# Patient Record
Sex: FEMALE | Race: WHITE | NOT HISPANIC OR LATINO | Employment: OTHER | URBAN - METROPOLITAN AREA
[De-identification: names, ages, dates, MRNs, and addresses within clinical notes are randomized per-mention and may not be internally consistent; named-entity substitution may affect disease eponyms.]

---

## 2018-03-02 ENCOUNTER — AMBULATORY CONVERSION ENCOUNTER (OUTPATIENT)
Dept: FAMILY MEDICINE | Facility: CLINIC | Age: 83
End: 2018-03-02

## 2018-04-03 RX ORDER — PREDNISONE 10 MG/1
TABLET ORAL
Qty: 30 TABLET | Refills: 0 | Status: SHIPPED | OUTPATIENT
Start: 2018-04-03 | End: 2018-04-05 | Stop reason: SDUPTHER

## 2018-04-03 RX ORDER — PROCHLORPERAZINE MALEATE 5 MG
TABLET ORAL
Qty: 30 TABLET | Refills: 1 | Status: SHIPPED | OUTPATIENT
Start: 2018-04-03 | End: 2018-05-25

## 2018-04-05 ENCOUNTER — OFFICE VISIT (OUTPATIENT)
Dept: FAMILY MEDICINE | Facility: CLINIC | Age: 83
End: 2018-04-05
Payer: MEDICARE

## 2018-04-05 VITALS
HEART RATE: 60 BPM | HEIGHT: 60 IN | SYSTOLIC BLOOD PRESSURE: 122 MMHG | DIASTOLIC BLOOD PRESSURE: 70 MMHG | TEMPERATURE: 98.2 F | WEIGHT: 95 LBS | BODY MASS INDEX: 18.65 KG/M2 | RESPIRATION RATE: 12 BRPM

## 2018-04-05 DIAGNOSIS — F32.A ANXIETY AND DEPRESSION: ICD-10-CM

## 2018-04-05 DIAGNOSIS — J01.00 ACUTE NON-RECURRENT MAXILLARY SINUSITIS: Primary | ICD-10-CM

## 2018-04-05 DIAGNOSIS — M15.9 OSTEOARTHRITIS INVOLVING MULTIPLE JOINTS ON BOTH SIDES OF BODY: ICD-10-CM

## 2018-04-05 DIAGNOSIS — F41.9 ANXIETY AND DEPRESSION: ICD-10-CM

## 2018-04-05 PROBLEM — M51.369 DEGENERATIVE DISC DISEASE, LUMBAR: Status: ACTIVE | Noted: 2017-01-09

## 2018-04-05 PROBLEM — K21.9 GERD WITHOUT ESOPHAGITIS: Status: ACTIVE | Noted: 2018-04-05

## 2018-04-05 PROBLEM — N18.9 CHRONIC KIDNEY DISEASE: Status: ACTIVE | Noted: 2017-01-09

## 2018-04-05 PROBLEM — M10.9 GOUT: Status: ACTIVE | Noted: 2017-01-09

## 2018-04-05 PROBLEM — E03.9 HYPOTHYROIDISM: Status: ACTIVE | Noted: 2017-01-09

## 2018-04-05 PROBLEM — J45.20 MILD INTERMITTENT ASTHMA WITHOUT COMPLICATION: Status: ACTIVE | Noted: 2018-04-05

## 2018-04-05 PROCEDURE — 99214 OFFICE O/P EST MOD 30 MIN: CPT | Performed by: FAMILY MEDICINE

## 2018-04-05 RX ORDER — LEVOTHYROXINE SODIUM 100 UG/1
TABLET ORAL
Refills: 0 | COMMUNITY
Start: 2018-04-03 | End: 2018-06-13

## 2018-04-05 RX ORDER — PROCHLORPERAZINE MALEATE 5 MG
5 TABLET ORAL
COMMUNITY
Start: 2017-11-28 | End: 2018-08-09

## 2018-04-05 RX ORDER — MUPIROCIN 20 MG/G
2 OINTMENT TOPICAL
COMMUNITY
Start: 2017-07-07 | End: 2020-12-30

## 2018-04-05 RX ORDER — ALLOPURINOL 300 MG/1
300 TABLET ORAL DAILY
COMMUNITY
Start: 2017-11-28 | End: 2018-06-01 | Stop reason: SDUPTHER

## 2018-04-05 RX ORDER — BUPROPION HYDROCHLORIDE 100 MG/1
100 TABLET, EXTENDED RELEASE ORAL DAILY
COMMUNITY
Start: 2017-11-28 | End: 2018-06-04 | Stop reason: SDUPTHER

## 2018-04-05 RX ORDER — NITROFURANTOIN 25; 75 MG/1; MG/1
CAPSULE ORAL
Refills: 0 | COMMUNITY
Start: 2018-02-02 | End: 2018-06-13

## 2018-04-05 RX ORDER — PREDNISONE 2.5 MG/1
2.5 TABLET ORAL 3 TIMES DAILY
COMMUNITY
Start: 2018-02-02 | End: 2018-04-05 | Stop reason: SDUPTHER

## 2018-04-05 RX ORDER — CITALOPRAM 20 MG/1
20 TABLET, FILM COATED ORAL DAILY
COMMUNITY
Start: 2017-07-03 | End: 2018-04-05 | Stop reason: SDUPTHER

## 2018-04-05 RX ORDER — LEVOTHYROXINE SODIUM 88 UG/1
88 TABLET ORAL DAILY
COMMUNITY
Start: 2018-01-15 | End: 2019-01-16

## 2018-04-05 RX ORDER — PREDNISONE 1 MG/1
TABLET ORAL
Qty: 120 TABLET | Refills: 1 | Status: SHIPPED | OUTPATIENT
Start: 2018-04-05 | End: 2018-05-25

## 2018-04-05 RX ORDER — ESTRADIOL 0.1 MG/G
CREAM VAGINAL
COMMUNITY
Start: 2018-02-02 | End: 2020-12-30

## 2018-04-05 RX ORDER — CEFDINIR 300 MG/1
300 CAPSULE ORAL 2 TIMES DAILY
Qty: 14 CAPSULE | Refills: 0 | Status: SHIPPED | OUTPATIENT
Start: 2018-04-05 | End: 2019-02-15

## 2018-04-05 RX ORDER — CITALOPRAM 20 MG/1
20 TABLET, FILM COATED ORAL DAILY
Qty: 90 TABLET | Refills: 1 | Status: SHIPPED | OUTPATIENT
Start: 2018-04-05 | End: 2018-09-30 | Stop reason: SDUPTHER

## 2018-04-05 RX ORDER — OMEPRAZOLE 40 MG/1
40 CAPSULE, DELAYED RELEASE ORAL DAILY
Refills: 0 | COMMUNITY
Start: 2018-02-28 | End: 2019-07-05

## 2018-04-05 RX ORDER — CALCIUM CARBONATE/VITAMIN D3 500-10/5ML
400 LIQUID (ML) ORAL
COMMUNITY
Start: 2017-01-09

## 2018-04-05 ASSESSMENT — ENCOUNTER SYMPTOMS
RHINORRHEA: 1
SORE THROAT: 1
COUGH: 1
SINUS PAIN: 1

## 2018-04-05 NOTE — PROGRESS NOTES
Patient ID: Lida Foley is a 82 y.o. female.    Subjective     Arthritis-- wants to stop prednisone. Makes her feel bloated.    Ran out of celexa and feelign depresses/crying.      URI    This is a new problem. The current episode started 1 to 4 weeks ago. There has been no fever. Associated symptoms include coughing, rhinorrhea, sinus pain and a sore throat.       The following have been reviewed and updated as appropriate in this visit:       Review of Systems   HENT: Positive for rhinorrhea, sinus pain and sore throat.    Respiratory: Positive for cough.          Objective     Physical Exam   Constitutional: She appears well-developed.   HENT:   Head: Normocephalic. Head is with right periorbital erythema and with left periorbital erythema.   Nose: Sinus tenderness (bilateral frontal and maxillary) present.   Mouth/Throat: Oropharynx is clear and moist.   Eyes: Pupils are equal, round, and reactive to light.   Neck: Normal range of motion.   Cardiovascular: Normal rate, regular rhythm and normal heart sounds.    Pulmonary/Chest: Effort normal and breath sounds normal.   Lymphadenopathy:     She has no cervical adenopathy.   Skin: Skin is warm and dry.   Nursing note and vitals reviewed.      Assessment/Plan       Problem List Items Addressed This Visit     Anxiety and depression    Relevant Medications    citalopram (CELEXA) 20 mg tablet    Osteoarthritis involving multiple joints on both sides of body    Acute non-recurrent maxillary sinusitis - Primary

## 2018-05-25 ENCOUNTER — HOSPITAL ENCOUNTER (OUTPATIENT)
Dept: RADIOLOGY | Facility: CLINIC | Age: 83
Discharge: HOME | End: 2018-05-25
Attending: FAMILY MEDICINE
Payer: MEDICARE

## 2018-05-25 ENCOUNTER — OFFICE VISIT (OUTPATIENT)
Dept: FAMILY MEDICINE | Facility: CLINIC | Age: 83
End: 2018-05-25
Payer: MEDICARE

## 2018-05-25 VITALS
RESPIRATION RATE: 20 BRPM | TEMPERATURE: 98.1 F | BODY MASS INDEX: 19.24 KG/M2 | DIASTOLIC BLOOD PRESSURE: 62 MMHG | HEIGHT: 60 IN | OXYGEN SATURATION: 96 % | WEIGHT: 98 LBS | HEART RATE: 80 BPM | SYSTOLIC BLOOD PRESSURE: 98 MMHG

## 2018-05-25 DIAGNOSIS — J01.00 ACUTE NON-RECURRENT MAXILLARY SINUSITIS: Primary | ICD-10-CM

## 2018-05-25 DIAGNOSIS — R05.9 COUGH: ICD-10-CM

## 2018-05-25 PROCEDURE — 99214 OFFICE O/P EST MOD 30 MIN: CPT | Performed by: FAMILY MEDICINE

## 2018-05-25 PROCEDURE — 71046 X-RAY EXAM CHEST 2 VIEWS: CPT

## 2018-05-25 RX ORDER — CEFDINIR 300 MG/1
300 CAPSULE ORAL 2 TIMES DAILY
Qty: 14 CAPSULE | Refills: 0 | Status: SHIPPED | OUTPATIENT
Start: 2018-05-25 | End: 2019-02-15

## 2018-05-25 RX ORDER — ZOLEDRONIC ACID 5 MG/100ML
5 INJECTION, SOLUTION INTRAVENOUS ONCE
COMMUNITY
End: 2021-08-31

## 2018-05-25 ASSESSMENT — ENCOUNTER SYMPTOMS
SHORTNESS OF BREATH: 0
COUGH: 1
WHEEZING: 0
SINUS PAIN: 1
SORE THROAT: 0

## 2018-05-25 NOTE — PROGRESS NOTES
Patient ID: Lida Foley is a 83 y.o. female.    Subjective     Having maxillary sinua pain.       URI    Episode onset: 2 weeks ago. There has been no fever. Associated symptoms include coughing and sinus pain. Pertinent negatives include no chest pain, sore throat or wheezing.       The following have been reviewed and updated as appropriate in this visit:  Tobacco  Allergies  Meds  Problems       Review of Systems   Constitutional: Positive for fatigue. Negative for fever.   HENT: Positive for sinus pain. Negative for sore throat.    Respiratory: Positive for cough. Negative for shortness of breath and wheezing.    Cardiovascular: Negative for chest pain.         Objective     Physical Exam   Constitutional: She appears well-developed.   HENT:   Head: Normocephalic.   Mouth/Throat: Oropharynx is clear and moist.   Eyes: Pupils are equal, round, and reactive to light.   Neck: Normal range of motion. No thyromegaly present.   Cardiovascular: Normal rate, regular rhythm and normal heart sounds.    No murmur heard.  Pulmonary/Chest: Effort normal and breath sounds normal.   Diminished breath sounds left base.   Abdominal: Soft. There is no tenderness.   Musculoskeletal: She exhibits no edema.   Lymphadenopathy:     She has no cervical adenopathy.   Skin: Skin is warm and dry.   Nursing note and vitals reviewed.      Assessment/Plan       Problem List Items Addressed This Visit     Acute non-recurrent maxillary sinusitis - Primary     Cefdinir. Supportive care. Saline.          Cough     Obtain chest xray.          Relevant Orders    X-RAY CHEST 2 VIEWS (Completed)

## 2018-05-29 ENCOUNTER — TELEPHONE (OUTPATIENT)
Dept: FAMILY MEDICINE | Facility: CLINIC | Age: 83
End: 2018-05-29

## 2018-05-30 ASSESSMENT — ENCOUNTER SYMPTOMS
FATIGUE: 1
FEVER: 0

## 2018-06-01 RX ORDER — ALLOPURINOL 300 MG/1
TABLET ORAL
Qty: 90 TABLET | Refills: 1 | Status: SHIPPED | OUTPATIENT
Start: 2018-06-01 | End: 2018-06-04 | Stop reason: SDUPTHER

## 2018-06-04 RX ORDER — ALLOPURINOL 300 MG/1
300 TABLET ORAL
Qty: 90 TABLET | Refills: 1 | Status: SHIPPED | OUTPATIENT
Start: 2018-06-04 | End: 2019-12-12 | Stop reason: SDUPTHER

## 2018-06-04 RX ORDER — BUPROPION HYDROCHLORIDE 100 MG/1
TABLET, EXTENDED RELEASE ORAL
Qty: 90 TABLET | Refills: 1 | Status: SHIPPED | OUTPATIENT
Start: 2018-06-04 | End: 2019-04-18

## 2018-06-04 RX ORDER — PROCHLORPERAZINE MALEATE 5 MG
TABLET ORAL
Qty: 30 TABLET | Refills: 1 | Status: SHIPPED | OUTPATIENT
Start: 2018-06-04 | End: 2018-06-13

## 2018-06-13 ENCOUNTER — OFFICE VISIT (OUTPATIENT)
Dept: FAMILY MEDICINE | Facility: CLINIC | Age: 83
End: 2018-06-13
Payer: MEDICARE

## 2018-06-13 VITALS
HEIGHT: 60 IN | WEIGHT: 99 LBS | HEART RATE: 60 BPM | RESPIRATION RATE: 20 BRPM | BODY MASS INDEX: 19.44 KG/M2 | SYSTOLIC BLOOD PRESSURE: 102 MMHG | DIASTOLIC BLOOD PRESSURE: 60 MMHG | TEMPERATURE: 98.3 F

## 2018-06-13 DIAGNOSIS — J32.8 OTHER CHRONIC SINUSITIS: Primary | ICD-10-CM

## 2018-06-13 PROBLEM — I82.409 DVT (DEEP VENOUS THROMBOSIS) (CMS/HCC): Status: ACTIVE | Noted: 2018-06-13

## 2018-06-13 PROBLEM — E78.5 HYPERLIPIDEMIA: Status: ACTIVE | Noted: 2018-06-13

## 2018-06-13 PROBLEM — K31.84 GASTROPARESIS: Status: ACTIVE | Noted: 2018-06-13

## 2018-06-13 PROBLEM — A49.02 MRSA (METHICILLIN RESISTANT STAPHYLOCOCCUS AUREUS): Status: ACTIVE | Noted: 2018-06-13

## 2018-06-13 PROBLEM — J01.00 ACUTE NON-RECURRENT MAXILLARY SINUSITIS: Status: RESOLVED | Noted: 2018-04-05 | Resolved: 2018-06-13

## 2018-06-13 PROCEDURE — 99213 OFFICE O/P EST LOW 20 MIN: CPT | Performed by: FAMILY MEDICINE

## 2018-06-13 RX ORDER — MONTELUKAST SODIUM 10 MG/1
10 TABLET ORAL NIGHTLY
Qty: 90 TABLET | Refills: 1 | Status: SHIPPED | OUTPATIENT
Start: 2018-06-13 | End: 2018-09-21 | Stop reason: SDUPTHER

## 2018-06-13 ASSESSMENT — ENCOUNTER SYMPTOMS: SINUS PAIN: 1

## 2018-06-13 NOTE — ASSESSMENT & PLAN NOTE
Did not respond to recent abx. Suspect more inflammatory than infectious. Asked her to f/u with ENT. Trial of singulair. Continue nasal steroid spray.

## 2018-06-13 NOTE — PATIENT INSTRUCTIONS
Lida was seen today for uri.    Diagnoses and all orders for this visit:    Other chronic sinusitis    Other orders  -     montelukast (SINGULAIR) 10 mg tablet; Take 1 tablet (10 mg total) by mouth nightly.    Continue using Rhinocort nasal spray daily  Sched appt with ENT

## 2018-06-14 ENCOUNTER — TRANSCRIBE ORDERS (OUTPATIENT)
Dept: SCHEDULING | Age: 83
End: 2018-06-14

## 2018-06-14 DIAGNOSIS — N18.2 CHRONIC KIDNEY DISEASE, STAGE II (MILD): Primary | ICD-10-CM

## 2018-06-26 ENCOUNTER — HOSPITAL ENCOUNTER (OUTPATIENT)
Dept: RADIOLOGY | Age: 83
Discharge: HOME | End: 2018-06-26
Attending: INTERNAL MEDICINE
Payer: MEDICARE

## 2018-06-26 DIAGNOSIS — N18.2 CHRONIC KIDNEY DISEASE, STAGE II (MILD): ICD-10-CM

## 2018-06-26 PROCEDURE — 76770 US EXAM ABDO BACK WALL COMP: CPT

## 2018-07-20 ENCOUNTER — OFFICE VISIT (OUTPATIENT)
Dept: FAMILY MEDICINE | Facility: CLINIC | Age: 83
End: 2018-07-20
Payer: MEDICARE

## 2018-07-20 VITALS
DIASTOLIC BLOOD PRESSURE: 68 MMHG | TEMPERATURE: 97.8 F | WEIGHT: 95.6 LBS | BODY MASS INDEX: 18.77 KG/M2 | HEIGHT: 60 IN | SYSTOLIC BLOOD PRESSURE: 100 MMHG | HEART RATE: 80 BPM

## 2018-07-20 DIAGNOSIS — K31.84 GASTROPARESIS: Primary | ICD-10-CM

## 2018-07-20 DIAGNOSIS — M81.0 AGE-RELATED OSTEOPOROSIS WITHOUT CURRENT PATHOLOGICAL FRACTURE: ICD-10-CM

## 2018-07-20 DIAGNOSIS — R63.4 WEIGHT LOSS, UNINTENTIONAL: ICD-10-CM

## 2018-07-20 DIAGNOSIS — F32.A ANXIETY AND DEPRESSION: ICD-10-CM

## 2018-07-20 DIAGNOSIS — F41.9 ANXIETY AND DEPRESSION: ICD-10-CM

## 2018-07-20 DIAGNOSIS — E06.3 HYPOTHYROIDISM DUE TO HASHIMOTO'S THYROIDITIS: ICD-10-CM

## 2018-07-20 DIAGNOSIS — J45.20 MILD INTERMITTENT ASTHMA WITHOUT COMPLICATION: ICD-10-CM

## 2018-07-20 DIAGNOSIS — N18.2 STAGE 2 CHRONIC KIDNEY DISEASE: ICD-10-CM

## 2018-07-20 DIAGNOSIS — K21.9 GERD WITHOUT ESOPHAGITIS: ICD-10-CM

## 2018-07-20 PROBLEM — R05.9 COUGH: Status: RESOLVED | Noted: 2018-05-25 | Resolved: 2018-07-20

## 2018-07-20 PROCEDURE — 99214 OFFICE O/P EST MOD 30 MIN: CPT | Performed by: FAMILY MEDICINE

## 2018-07-20 ASSESSMENT — ENCOUNTER SYMPTOMS
CHEST TIGHTNESS: 0
ABDOMINAL PAIN: 0
SHORTNESS OF BREATH: 0
BLOOD IN STOOL: 0
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
## 2018-08-08 NOTE — TELEPHONE ENCOUNTER
Pt has been out of medication for several days.    Last Office Visit  0720/2018        prochlorperazine maleate 5 mg take 1 tablet by mouth every 8 hours if needed      Medication List     Pharmacy confirmed.

## 2018-08-09 RX ORDER — PROCHLORPERAZINE MALEATE 5 MG
TABLET ORAL
Qty: 30 TABLET | Refills: 1 | Status: SHIPPED | OUTPATIENT
Start: 2018-08-09 | End: 2018-10-01 | Stop reason: SDUPTHER

## 2018-09-21 ENCOUNTER — OFFICE VISIT (OUTPATIENT)
Dept: FAMILY MEDICINE | Facility: CLINIC | Age: 83
End: 2018-09-21
Payer: MEDICARE

## 2018-09-21 VITALS
BODY MASS INDEX: 19.16 KG/M2 | HEIGHT: 60 IN | TEMPERATURE: 97.5 F | HEART RATE: 72 BPM | SYSTOLIC BLOOD PRESSURE: 120 MMHG | WEIGHT: 97.6 LBS | DIASTOLIC BLOOD PRESSURE: 60 MMHG

## 2018-09-21 DIAGNOSIS — Z23 NEED FOR IMMUNIZATION AGAINST INFLUENZA: Primary | ICD-10-CM

## 2018-09-21 DIAGNOSIS — M15.9 OSTEOARTHRITIS INVOLVING MULTIPLE JOINTS ON BOTH SIDES OF BODY: ICD-10-CM

## 2018-09-21 DIAGNOSIS — J30.0 CHRONIC VASOMOTOR RHINITIS: ICD-10-CM

## 2018-09-21 DIAGNOSIS — F43.10 STRESS DISORDER, POST TRAUMATIC: ICD-10-CM

## 2018-09-21 DIAGNOSIS — K21.9 GERD WITHOUT ESOPHAGITIS: ICD-10-CM

## 2018-09-21 DIAGNOSIS — N18.2 STAGE 2 CHRONIC KIDNEY DISEASE: ICD-10-CM

## 2018-09-21 DIAGNOSIS — J45.20 MILD INTERMITTENT ASTHMA WITHOUT COMPLICATION: ICD-10-CM

## 2018-09-21 DIAGNOSIS — L29.9 PRURITUS: ICD-10-CM

## 2018-09-21 DIAGNOSIS — E06.3 HYPOTHYROIDISM DUE TO HASHIMOTO'S THYROIDITIS: ICD-10-CM

## 2018-09-21 DIAGNOSIS — M51.369 DEGENERATIVE DISC DISEASE, LUMBAR: ICD-10-CM

## 2018-09-21 PROCEDURE — G0008 ADMIN INFLUENZA VIRUS VAC: HCPCS | Performed by: FAMILY MEDICINE

## 2018-09-21 PROCEDURE — 99214 OFFICE O/P EST MOD 30 MIN: CPT | Mod: 25 | Performed by: FAMILY MEDICINE

## 2018-09-21 PROCEDURE — 90653 IIV ADJUVANT VACCINE IM: CPT | Performed by: FAMILY MEDICINE

## 2018-09-21 RX ORDER — IPRATROPIUM BROMIDE 42 UG/1
2 SPRAY, METERED NASAL 3 TIMES DAILY
Qty: 15 ML | Refills: 12 | Status: SHIPPED | OUTPATIENT
Start: 2018-09-21 | End: 2019-09-21

## 2018-09-21 RX ORDER — MINERAL OIL
180 ENEMA (ML) RECTAL DAILY
COMMUNITY

## 2018-09-21 RX ORDER — MONTELUKAST SODIUM 10 MG/1
10 TABLET ORAL NIGHTLY
Qty: 90 TABLET | Refills: 1 | Status: SHIPPED | OUTPATIENT
Start: 2018-09-21 | End: 2019-04-18

## 2018-09-21 NOTE — PATIENT INSTRUCTIONS
Switch skin care products to Vanicream soap and cream (available at [facility])
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
## 2018-09-26 PROBLEM — F43.10 STRESS DISORDER, POST TRAUMATIC: Status: ACTIVE | Noted: 2018-09-26

## 2018-09-26 ASSESSMENT — ENCOUNTER SYMPTOMS
COUGH: 1
RHINORRHEA: 1
ABDOMINAL PAIN: 0
DYSURIA: 0
FEVER: 0

## 2018-10-01 NOTE — TELEPHONE ENCOUNTER
Pt son dropped in and she needs a refill on prochlorperazine 5 mg  One tablet  q 8 hrs   And citalopram 20mg  Which is already sent to dr López

## 2018-10-02 RX ORDER — CITALOPRAM 20 MG/1
TABLET, FILM COATED ORAL
Qty: 90 TABLET | Refills: 1 | Status: SHIPPED | OUTPATIENT
Start: 2018-10-02 | End: 2019-04-03 | Stop reason: SDUPTHER

## 2018-10-02 RX ORDER — PROCHLORPERAZINE MALEATE 5 MG
5 TABLET ORAL EVERY 8 HOURS PRN
Qty: 30 TABLET | Refills: 1 | Status: SHIPPED | OUTPATIENT
Start: 2018-10-02 | End: 2018-10-19

## 2018-10-19 ENCOUNTER — OFFICE VISIT (OUTPATIENT)
Dept: FAMILY MEDICINE | Facility: CLINIC | Age: 83
End: 2018-10-19
Payer: MEDICARE

## 2018-10-19 VITALS
OXYGEN SATURATION: 97 % | BODY MASS INDEX: 19.75 KG/M2 | DIASTOLIC BLOOD PRESSURE: 58 MMHG | TEMPERATURE: 97.7 F | HEART RATE: 72 BPM | RESPIRATION RATE: 20 BRPM | HEIGHT: 60 IN | WEIGHT: 100.6 LBS | SYSTOLIC BLOOD PRESSURE: 104 MMHG

## 2018-10-19 DIAGNOSIS — J01.80 ACUTE NON-RECURRENT SINUSITIS OF OTHER SINUS: Primary | ICD-10-CM

## 2018-10-19 DIAGNOSIS — R11.0 NAUSEA: ICD-10-CM

## 2018-10-19 PROCEDURE — 99213 OFFICE O/P EST LOW 20 MIN: CPT | Performed by: FAMILY MEDICINE

## 2018-10-19 RX ORDER — ONDANSETRON 4 MG/1
4 TABLET, FILM COATED ORAL EVERY 8 HOURS PRN
Qty: 30 TABLET | Refills: 2 | Status: SHIPPED | OUTPATIENT
Start: 2018-10-19 | End: 2019-07-05

## 2018-10-19 RX ORDER — CEFDINIR 300 MG/1
300 CAPSULE ORAL 2 TIMES DAILY
Qty: 14 CAPSULE | Refills: 0 | Status: SHIPPED | OUTPATIENT
Start: 2018-10-19 | End: 2019-01-10 | Stop reason: SDUPTHER

## 2018-10-19 ASSESSMENT — ENCOUNTER SYMPTOMS
COUGH: 1
HEADACHES: 1
RHINORRHEA: 1
SINUS PAIN: 1
NAUSEA: 1

## 2018-10-19 NOTE — PROGRESS NOTES
Patient ID: Lida Foley is a 83 y.o. female.        Subjective     URI    This is a new problem. The current episode started 1 to 4 weeks ago. There has been no fever. Associated symptoms include coughing, headaches, nausea, rhinorrhea and sinus pain.         The following have been reviewed and updated as appropriate in this visit:  Allergies  Meds  Problems       Patient Active Problem List   Diagnosis   • Anxiety and depression   • Stage 2 chronic kidney disease   • Degenerative disc disease, lumbar   • Gout   • Hypothyroidism due to Hashimoto's thyroiditis   • Mild intermittent asthma without complication   • GERD without esophagitis   • Osteoarthritis involving multiple joints on both sides of body   • Other chronic sinusitis   • MRSA (methicillin resistant Staphylococcus aureus)   • PAC (premature atrial contraction)   • Hyperlipidemia   • Gastroparesis   • DVT (deep venous thrombosis) (CMS/HCC) (HCC)   • Age-related osteoporosis without current pathological fracture   • Weight loss, unintentional   • Pruritus   • Chronic vasomotor rhinitis   • Stress disorder, post traumatic   • Nausea       Current Outpatient Prescriptions:   •  allopurinol (ZYLOPRIM) 300 mg tablet, Take 1 tablet (300 mg total) by mouth once daily., Disp: 90 tablet, Rfl: 1  •  buPROPion SR (WELLBUTRIN SR) 100 mg 12 hr tablet, take 1 tablet by mouth once daily, Disp: 90 tablet, Rfl: 1  •  citalopram (CELEXA) 20 mg tablet, take 1 tablet by mouth once daily, Disp: 90 tablet, Rfl: 1  •  conjugated estrogens (PREMARIN) 0.625 mg/gram vaginal cream, apply small pea sized amount to external vaginal/urethral area daily for 2 weeks then 2x/week, Disp: , Rfl:   •  estradiol (ESTRACE) 0.01 % (0.1 mg/gram) vaginal cream, insert (1G)  by vaginal route 2x/week, Disp: , Rfl:   •  fexofenadine (ALLEGRA) 180 mg tablet, Take 180 mg by mouth daily., Disp: , Rfl:   •  ipratropium (ATROVENT) 42 mcg (0.06 %) nasal spray, Administer 2 sprays into each  nostril 3 (three) times a day., Disp: 15 mL, Rfl: 12  •  levothyroxine (SYNTHROID) 88 mcg tablet, Take 88 mcg by mouth daily., Disp: , Rfl:   •  magnesium oxide 400 mg capsule, 400 mg., Disp: , Rfl:   •  montelukast (SINGULAIR) 10 mg tablet, Take 1 tablet (10 mg total) by mouth nightly., Disp: 90 tablet, Rfl: 1  •  mupirocin (BACTROBAN) 2 % ointment, 2 %., Disp: , Rfl:   •  omeprazole (PriLOSEC) 40 mg capsule, Take 40 mg by mouth daily., Disp: , Rfl: 0  •  zoledronic acid (RECLAST) IVPB, Infuse 5 mg into a venous catheter once., Disp: , Rfl:   •  cefdinir (OMNICEF) 300 mg capsule, Take 1 capsule (300 mg total) by mouth 2 (two) times a day for 7 days., Disp: 14 capsule, Rfl: 0  •  ondansetron (ZOFRAN) 4 mg tablet, Take 1 tablet (4 mg total) by mouth every 8 (eight) hours as needed for nausea or vomiting., Disp: 30 tablet, Rfl: 2    Allergies   Allergen Reactions   • Ciprofloxacin      Other reaction(s): Vomiting   • Codeine      Other reaction(s): abdominal pain   • Iodinated Contrast- Oral And Iv Dye      Other reaction(s): Anaphylaxis   • Latex      Other reaction(s): Hives   • Moxifloxacin Hcl      Other reaction(s): hives   • Nsaids (Non-Steroidal Anti-Inflammatory Drug)      Other reaction(s): kidney failure   • Penicillins      Other reaction(s): Hives   • Sulfa (Sulfonamide Antibiotics)      Other reaction(s): Hives     Review of Systems   HENT: Positive for rhinorrhea and sinus pain.    Respiratory: Positive for cough.    Gastrointestinal: Positive for nausea.   Neurological: Positive for headaches.         Objective     Vitals:    10/19/18 1328   BP: (!) 104/58   BP Location: Left upper arm   Patient Position: Sitting   Pulse: 72   Resp: 20   Temp: 36.5 °C (97.7 °F)   TempSrc: Oral   SpO2: 97%   Weight: 45.6 kg (100 lb 9.6 oz)   Height: 1.524 m (5')     Physical Exam   Constitutional: She appears well-developed and well-nourished.   HENT:   Head: Normocephalic.   Right Ear: External ear normal.   Left Ear:  External ear normal.   Nose: Right sinus exhibits maxillary sinus tenderness. Left sinus exhibits maxillary sinus tenderness.   Mouth/Throat: Oropharynx is clear and moist.   Eyes: Pupils are equal, round, and reactive to light.   Neck: Neck supple. No thyromegaly present.   Cardiovascular: Normal rate, regular rhythm and normal heart sounds.    Pulmonary/Chest: Effort normal and breath sounds normal.   Abdominal: There is no tenderness.   Lymphadenopathy:     She has no cervical adenopathy.   Skin: Skin is warm and dry.   Nursing note and vitals reviewed.      Assessment/Plan       Problem List Items Addressed This Visit        Other    Nausea    Relevant Medications    ondansetron (ZOFRAN) 4 mg tablet      Other Visit Diagnoses     Acute non-recurrent sinusitis of other sinus    -  Primary    Relevant Medications    cefdinir (OMNICEF) 300 mg capsule      stop compazine. Trial of zofran. Course of abx.

## 2019-01-10 ENCOUNTER — OFFICE VISIT (OUTPATIENT)
Dept: FAMILY MEDICINE | Facility: CLINIC | Age: 84
End: 2019-01-10
Payer: MEDICARE

## 2019-01-10 VITALS
HEART RATE: 84 BPM | TEMPERATURE: 98.4 F | SYSTOLIC BLOOD PRESSURE: 138 MMHG | OXYGEN SATURATION: 97 % | DIASTOLIC BLOOD PRESSURE: 60 MMHG | HEIGHT: 60 IN | RESPIRATION RATE: 14 BRPM | BODY MASS INDEX: 18.85 KG/M2 | WEIGHT: 96 LBS

## 2019-01-10 DIAGNOSIS — R63.4 WEIGHT LOSS, UNINTENTIONAL: ICD-10-CM

## 2019-01-10 DIAGNOSIS — E06.3 HYPOTHYROIDISM DUE TO HASHIMOTO'S THYROIDITIS: ICD-10-CM

## 2019-01-10 DIAGNOSIS — J01.80 ACUTE NON-RECURRENT SINUSITIS OF OTHER SINUS: ICD-10-CM

## 2019-01-10 DIAGNOSIS — R53.83 FATIGUE, UNSPECIFIED TYPE: Primary | ICD-10-CM

## 2019-01-10 LAB
ALBUMIN SERPL-MCNC: 3.9 G/DL (ref 3.4–5)
ALP SERPL-CCNC: 52 IU/L (ref 35–126)
ALT SERPL-CCNC: 16 IU/L (ref 11–54)
ANION GAP SERPL CALC-SCNC: 10 MEQ/L (ref 3–15)
AST SERPL-CCNC: 27 IU/L (ref 15–41)
BASOPHILS # BLD: 0.03 K/UL (ref 0.01–0.1)
BASOPHILS NFR BLD: 0.4 %
BILIRUB SERPL-MCNC: 0.4 MG/DL (ref 0.3–1.2)
BUN SERPL-MCNC: 18 MG/DL (ref 8–20)
CALCIUM SERPL-MCNC: 9.6 MG/DL (ref 8.9–10.3)
CHLORIDE SERPL-SCNC: 107 MEQ/L (ref 98–109)
CO2 SERPL-SCNC: 25 MEQ/L (ref 22–32)
CREAT SERPL-MCNC: 0.8 MG/DL
DIFFERENTIAL METHOD BLD: NORMAL
EOSINOPHIL # BLD: 0.21 K/UL (ref 0.04–0.36)
EOSINOPHIL NFR BLD: 3 %
ERYTHROCYTE [DISTWIDTH] IN BLOOD BY AUTOMATED COUNT: 13.8 % (ref 11.7–14.4)
GFR SERPL CREATININE-BSD FRML MDRD: >60 ML/MIN/1.73M*2
GLUCOSE SERPL-MCNC: 99 MG/DL (ref 70–99)
HCT VFR BLDCO AUTO: 40.7 %
HGB BLD-MCNC: 13 G/DL
IMM GRANULOCYTES # BLD AUTO: 0.02 K/UL (ref 0–0.08)
IMM GRANULOCYTES NFR BLD AUTO: 0.3 %
LYMPHOCYTES # BLD: 2.22 K/UL (ref 1.2–3.5)
LYMPHOCYTES NFR BLD: 31.7 %
MCH RBC QN AUTO: 31.3 PG (ref 28–33.2)
MCHC RBC AUTO-ENTMCNC: 31.9 G/DL (ref 32.2–35.5)
MCV RBC AUTO: 97.8 FL (ref 83–98)
MONOCYTES # BLD: 0.65 K/UL (ref 0.28–0.8)
MONOCYTES NFR BLD: 9.3 %
NEUTROPHILS # BLD: 3.88 K/UL (ref 1.7–7)
NEUTS SEG NFR BLD: 55.3 %
NRBC BLD-RTO: 0 %
PDW BLD AUTO: 10.8 FL (ref 9.4–12.3)
PLATELET # BLD AUTO: 197 K/UL
POTASSIUM SERPL-SCNC: 4.3 MEQ/L (ref 3.6–5.1)
PROT SERPL-MCNC: 6.1 G/DL (ref 6–8.2)
RBC # BLD AUTO: 4.16 M/UL (ref 3.93–5.22)
SODIUM SERPL-SCNC: 142 MEQ/L (ref 136–144)
TSH SERPL DL<=0.05 MIU/L-ACNC: 0.21 MIU/L (ref 0.34–5.6)
WBC # BLD AUTO: 7.01 K/UL

## 2019-01-10 PROCEDURE — 84443 ASSAY THYROID STIM HORMONE: CPT | Performed by: FAMILY MEDICINE

## 2019-01-10 PROCEDURE — 85025 COMPLETE CBC W/AUTO DIFF WBC: CPT | Performed by: FAMILY MEDICINE

## 2019-01-10 PROCEDURE — 80053 COMPREHEN METABOLIC PANEL: CPT | Performed by: FAMILY MEDICINE

## 2019-01-10 PROCEDURE — 99214 OFFICE O/P EST MOD 30 MIN: CPT | Performed by: FAMILY MEDICINE

## 2019-01-10 RX ORDER — CEFDINIR 300 MG/1
300 CAPSULE ORAL 2 TIMES DAILY
Qty: 14 CAPSULE | Refills: 0 | Status: SHIPPED | OUTPATIENT
Start: 2019-01-10 | End: 2019-04-18

## 2019-01-10 RX ORDER — CLOTRIMAZOLE 1 %
CREAM (GRAM) TOPICAL 2 TIMES DAILY
Qty: 14 G | Refills: 1 | Status: SHIPPED | OUTPATIENT
Start: 2019-01-10 | End: 2019-02-09

## 2019-01-10 ASSESSMENT — ENCOUNTER SYMPTOMS
APPETITE CHANGE: 1
FATIGUE: 1
NAUSEA: 1
FEVER: 0
UNEXPECTED WEIGHT CHANGE: 1
SHORTNESS OF BREATH: 1
COUGH: 1

## 2019-01-10 NOTE — PROGRESS NOTES
Patient ID: Lida Foley is a 83 y.o. female.        Subjective     Sinus pain       Cough   This is a new problem. Episode onset: 1 week. Associated symptoms include postnasal drip and shortness of breath. Pertinent negatives include no fever.   URI    Associated symptoms include coughing and nausea.         The following have been reviewed and updated as appropriate in this visit:  Allergies  Meds  Problems       Patient Active Problem List   Diagnosis   • Anxiety and depression   • Stage 2 chronic kidney disease   • Degenerative disc disease, lumbar   • Gout   • Hypothyroidism due to Hashimoto's thyroiditis   • Mild intermittent asthma without complication   • GERD without esophagitis   • Osteoarthritis involving multiple joints on both sides of body   • Other chronic sinusitis   • MRSA (methicillin resistant Staphylococcus aureus)   • PAC (premature atrial contraction)   • Hyperlipidemia   • Gastroparesis   • DVT (deep venous thrombosis) (CMS/HCC) (HCC)   • Age-related osteoporosis without current pathological fracture   • Weight loss, unintentional   • Pruritus   • Chronic vasomotor rhinitis   • Stress disorder, post traumatic   • Nausea       Current Outpatient Prescriptions:   •  buPROPion SR (WELLBUTRIN SR) 100 mg 12 hr tablet, take 1 tablet by mouth once daily, Disp: 90 tablet, Rfl: 1  •  citalopram (CELEXA) 20 mg tablet, take 1 tablet by mouth once daily, Disp: 90 tablet, Rfl: 1  •  clotrimazole (LOTRIMIN) 1 % topical cream, Apply topically 2 (two) times a day., Disp: 14 g, Rfl: 1  •  conjugated estrogens (PREMARIN) 0.625 mg/gram vaginal cream, apply small pea sized amount to external vaginal/urethral area daily for 2 weeks then 2x/week, Disp: , Rfl:   •  estradiol (ESTRACE) 0.01 % (0.1 mg/gram) vaginal cream, insert (1G)  by vaginal route 2x/week, Disp: , Rfl:   •  fexofenadine (ALLEGRA) 180 mg tablet, Take 180 mg by mouth daily., Disp: , Rfl:   •  ipratropium (ATROVENT) 42 mcg (0.06 %) nasal  spray, Administer 2 sprays into each nostril 3 (three) times a day., Disp: 15 mL, Rfl: 12  •  levothyroxine (SYNTHROID) 75 mcg tablet, Take 1 tablet (75 mcg total) by mouth daily., Disp: 30 tablet, Rfl: 3  •  magnesium oxide 400 mg capsule, 400 mg., Disp: , Rfl:   •  montelukast (SINGULAIR) 10 mg tablet, Take 1 tablet (10 mg total) by mouth nightly., Disp: 90 tablet, Rfl: 1  •  mupirocin (BACTROBAN) 2 % ointment, 2 %., Disp: , Rfl:   •  omeprazole (PriLOSEC) 40 mg capsule, Take 40 mg by mouth daily., Disp: , Rfl: 0  •  ondansetron (ZOFRAN) 4 mg tablet, Take 1 tablet (4 mg total) by mouth every 8 (eight) hours as needed for nausea or vomiting., Disp: 30 tablet, Rfl: 2  •  zoledronic acid (RECLAST) IVPB, Infuse 5 mg into a venous catheter once., Disp: , Rfl:     Allergies   Allergen Reactions   • Ciprofloxacin      Other reaction(s): Vomiting   • Codeine      Other reaction(s): abdominal pain   • Iodinated Contrast- Oral And Iv Dye      Other reaction(s): Anaphylaxis   • Latex      Other reaction(s): Hives   • Moxifloxacin Hcl      Other reaction(s): hives   • Nsaids (Non-Steroidal Anti-Inflammatory Drug)      Other reaction(s): kidney failure   • Penicillins      Other reaction(s): Hives   • Sulfa (Sulfonamide Antibiotics)      Other reaction(s): Hives     Review of Systems   Constitutional: Positive for appetite change, fatigue and unexpected weight change. Negative for fever.   HENT: Positive for postnasal drip.    Respiratory: Positive for cough and shortness of breath.    Gastrointestinal: Positive for nausea.         Objective     Vitals:    01/10/19 1408   BP: 138/60   Pulse: 84   Resp: 14   Temp: 36.9 °C (98.4 °F)   SpO2: 97%   Weight: 43.5 kg (96 lb)   Height: 1.524 m (5')     Physical Exam   Constitutional: She appears well-developed.   HENT:   Head: Normocephalic.   Right Ear: External ear normal.   Left Ear: External ear normal.   Nose: Right sinus exhibits maxillary sinus tenderness and frontal sinus  tenderness. Left sinus exhibits maxillary sinus tenderness and frontal sinus tenderness.   Mouth/Throat: Oropharynx is clear and moist.   Eyes: Pupils are equal, round, and reactive to light.   Neck: Neck supple.   Cardiovascular: Normal rate, regular rhythm and normal heart sounds.    Pulmonary/Chest: Effort normal and breath sounds normal.   Abdominal: Soft. There is no tenderness.   Musculoskeletal: She exhibits no edema.   Lymphadenopathy:     She has no cervical adenopathy.   Skin: Skin is warm and dry.   Nursing note and vitals reviewed.      Assessment/Plan       Problem List Items Addressed This Visit        Other    Hypothyroidism due to Hashimoto's thyroiditis    Relevant Orders    TSH 3rd Generation (Completed)    Weight loss, unintentional      Other Visit Diagnoses     Fatigue, unspecified type    -  Primary    Relevant Orders    CBC and Differential (Completed)    Comprehensive metabolic panel (Completed)    CBC (Completed)    Diff Count (Completed)    Acute non-recurrent sinusitis of other sinus          cefdinir for infection. Needs labs checked. If not better let me know.

## 2019-01-11 ENCOUNTER — TELEPHONE (OUTPATIENT)
Dept: FAMILY MEDICINE | Facility: CLINIC | Age: 84
End: 2019-01-11

## 2019-01-11 NOTE — TELEPHONE ENCOUNTER
Thyroid dose is too high. We need to adjust her dose. Pls find out if she wants me to adjust it or her endocrinologist Dr Schwartz. Pls fax copy to him. Other labs are ok.

## 2019-01-14 NOTE — TELEPHONE ENCOUNTER
Called pt, left detailed message on machine. Asked patient to return call and inform whom she wants to adjust thyroid meds. Please fax labs to Dr. Schwartz, endocrinologist.

## 2019-01-14 NOTE — TELEPHONE ENCOUNTER
Pt's son Jet returned call and wants Dr. López to prescribe/manage thyroid medication.  He is aware dosing needs to be changed.     Please contact pt to advise when Rx has been sent.     Labs were also faxed to Dr. Schwartz's office at requested.

## 2019-01-16 RX ORDER — LEVOTHYROXINE SODIUM 75 UG/1
75 TABLET ORAL
Qty: 30 TABLET | Refills: 3 | Status: SHIPPED | OUTPATIENT
Start: 2019-01-16 | End: 2019-11-06 | Stop reason: SDUPTHER

## 2019-01-16 NOTE — TELEPHONE ENCOUNTER
Please call patient and I'll do lab slip to the lab of their choice. See if they are ok to go to lab and not office.

## 2019-01-17 DIAGNOSIS — E06.3 HYPOTHYROIDISM DUE TO HASHIMOTO'S THYROIDITIS: Primary | ICD-10-CM

## 2019-01-30 ENCOUNTER — TRANSCRIBE ORDERS (OUTPATIENT)
Dept: SCHEDULING | Age: 84
End: 2019-01-30

## 2019-01-30 DIAGNOSIS — N18.2 CHRONIC KIDNEY DISEASE, STAGE II (MILD): Primary | ICD-10-CM

## 2019-02-04 ENCOUNTER — TELEPHONE (OUTPATIENT)
Dept: FAMILY MEDICINE | Facility: CLINIC | Age: 84
End: 2019-02-04

## 2019-02-04 RX ORDER — PROCHLORPERAZINE MALEATE 5 MG
TABLET ORAL
Qty: 30 TABLET | Refills: 1 | Status: SHIPPED | OUTPATIENT
Start: 2019-02-04 | End: 2019-04-03 | Stop reason: SDUPTHER

## 2019-02-15 ENCOUNTER — OFFICE VISIT (OUTPATIENT)
Dept: FAMILY MEDICINE | Facility: CLINIC | Age: 84
End: 2019-02-15
Payer: MEDICARE

## 2019-02-15 VITALS
DIASTOLIC BLOOD PRESSURE: 68 MMHG | BODY MASS INDEX: 19.12 KG/M2 | TEMPERATURE: 97.7 F | WEIGHT: 97.4 LBS | SYSTOLIC BLOOD PRESSURE: 118 MMHG | HEART RATE: 73 BPM | HEIGHT: 60 IN

## 2019-02-15 DIAGNOSIS — E06.3 HYPOTHYROIDISM DUE TO HASHIMOTO'S THYROIDITIS: ICD-10-CM

## 2019-02-15 DIAGNOSIS — J32.9 CHRONIC SINUSITIS, UNSPECIFIED LOCATION: Primary | ICD-10-CM

## 2019-02-15 DIAGNOSIS — K21.9 GERD WITHOUT ESOPHAGITIS: ICD-10-CM

## 2019-02-15 DIAGNOSIS — J45.20 MILD INTERMITTENT ASTHMA WITHOUT COMPLICATION: ICD-10-CM

## 2019-02-15 PROCEDURE — 99214 OFFICE O/P EST MOD 30 MIN: CPT | Performed by: FAMILY MEDICINE

## 2019-02-15 RX ORDER — FLUTICASONE PROPIONATE 50 MCG
1 SPRAY, SUSPENSION (ML) NASAL 2 TIMES DAILY
Qty: 1 BOTTLE | Refills: 11 | Status: SHIPPED | OUTPATIENT
Start: 2019-02-15 | End: 2021-08-31

## 2019-02-15 RX ORDER — DICLOFENAC SODIUM 10 MG/G
GEL TOPICAL
Refills: 0 | COMMUNITY
Start: 2019-01-23

## 2019-02-15 ASSESSMENT — ENCOUNTER SYMPTOMS
FEVER: 0
COUGH: 1
SORE THROAT: 1

## 2019-02-15 NOTE — PROGRESS NOTES
Patient ID: Lida Foley is a 83 y.o. female.        Subjective     She is feeling better since thyroid med reduced. Maintaining her weight  Recent UTI finished abx 2 day ago.   Sinus pressure. Lost her atrovent. Not using any nasal spray currently. No fever. Sore throat worse in am. Maybe 2 weeks        Sore Throat    Associated symptoms include coughing.   Cough   Associated symptoms include a sore throat. Pertinent negatives include no fever.         The following have been reviewed and updated as appropriate in this visit:  Allergies  Meds  Problems       Patient Active Problem List   Diagnosis   • Anxiety and depression   • Stage 2 chronic kidney disease   • Degenerative disc disease, lumbar   • Gout   • Hypothyroidism due to Hashimoto's thyroiditis   • Mild intermittent asthma without complication   • GERD without esophagitis   • Osteoarthritis involving multiple joints on both sides of body   • Chronic sinusitis   • MRSA (methicillin resistant Staphylococcus aureus)   • PAC (premature atrial contraction)   • Hyperlipidemia   • Gastroparesis   • DVT (deep venous thrombosis) (CMS/HCC) ([facility])   • Age-related osteoporosis without current pathological fracture   • Weight loss, unintentional   • Pruritus   • Chronic vasomotor rhinitis   • Stress disorder, post traumatic   • Nausea       Current Outpatient Prescriptions:   •  buPROPion SR (WELLBUTRIN SR) 100 mg 12 hr tablet, take 1 tablet by mouth once daily, Disp: 90 tablet, Rfl: 1  •  citalopram (CELEXA) 20 mg tablet, take 1 tablet by mouth once daily, Disp: 90 tablet, Rfl: 1  •  conjugated estrogens (PREMARIN) 0.625 mg/gram vaginal cream, apply small pea sized amount to external vaginal/urethral area daily for 2 weeks then 2x/week, Disp: , Rfl:   •  estradiol (ESTRACE) 0.01 % (0.1 mg/gram) vaginal cream, insert (1G)  by vaginal route 2x/week, Disp: , Rfl:   •  fexofenadine (ALLEGRA) 180 mg tablet, Take 180 mg by mouth daily., Disp: , Rfl:   •  ipratropium  (ATROVENT) 42 mcg (0.06 %) nasal spray, Administer 2 sprays into each nostril 3 (three) times a day., Disp: 15 mL, Rfl: 12  •  levothyroxine (SYNTHROID) 75 mcg tablet, Take 1 tablet (75 mcg total) by mouth daily., Disp: 30 tablet, Rfl: 3  •  magnesium oxide 400 mg capsule, 400 mg., Disp: , Rfl:   •  montelukast (SINGULAIR) 10 mg tablet, Take 1 tablet (10 mg total) by mouth nightly., Disp: 90 tablet, Rfl: 1  •  mupirocin (BACTROBAN) 2 % ointment, 2 %., Disp: , Rfl:   •  omeprazole (PriLOSEC) 40 mg capsule, Take 40 mg by mouth daily., Disp: , Rfl: 0  •  ondansetron (ZOFRAN) 4 mg tablet, Take 1 tablet (4 mg total) by mouth every 8 (eight) hours as needed for nausea or vomiting., Disp: 30 tablet, Rfl: 2  •  prochlorperazine (COMPAZINE) 5 mg tablet, take 1 tablet by mouth every 8 hours if needed nausea and vomiting, Disp: 30 tablet, Rfl: 1  •  zoledronic acid (RECLAST) IVPB, Infuse 5 mg into a venous catheter once., Disp: , Rfl:   •  diclofenac sodium (VOLTAREN) 1 % topical gel, apply 4 grams to affected area four times a day, Disp: , Rfl: 0  •  fluticasone (FLONASE) 50 mcg/actuation nasal spray, Administer 1 spray into each nostril 2 (two) times a day., Disp: 1 Bottle, Rfl: 11    Allergies   Allergen Reactions   • Ciprofloxacin      Other reaction(s): Vomiting   • Codeine      Other reaction(s): abdominal pain   • Iodinated Contrast- Oral And Iv Dye      Other reaction(s): Anaphylaxis   • Latex      Other reaction(s): Hives   • Moxifloxacin Hcl      Other reaction(s): hives   • Nsaids (Non-Steroidal Anti-Inflammatory Drug)      Other reaction(s): kidney failure   • Penicillins      Other reaction(s): Hives   • Sulfa (Sulfonamide Antibiotics)      Other reaction(s): Hives     Review of Systems   Constitutional: Negative for fever.   HENT: Positive for sore throat.    Respiratory: Positive for cough.          Objective     Vitals:    02/15/19 0925   BP: 118/68   BP Location: Left upper arm   Patient Position: Sitting    Pulse: 73   Temp: 36.5 °C (97.7 °F)   TempSrc: Oral   Weight: 44.2 kg (97 lb 6.4 oz)   Height: 1.524 m (5')     Physical Exam   Constitutional: She appears well-developed and well-nourished.   HENT:   Head: Normocephalic.   Right Ear: Tympanic membrane normal.   Left Ear: Tympanic membrane normal.   Mouth/Throat: Oropharynx is clear and moist. No oropharyngeal exudate or posterior oropharyngeal erythema.   Eyes: Pupils are equal, round, and reactive to light.   Neck: Neck supple.   Cardiovascular: Normal rate, regular rhythm and normal heart sounds.    Pulmonary/Chest: Effort normal and breath sounds normal.   Skin: Skin is warm and dry.   Nursing note and vitals reviewed.      Assessment/Plan       Problem List Items Addressed This Visit        Other    Hypothyroidism due to Hashimoto's thyroiditis    Mild intermittent asthma without complication    GERD without esophagitis    Chronic sinusitis - Primary     Nasal steroid spray. Use daily           reminder to do her labs in 2 weeks to reassess thyroid. I donot think this is an acute infection. Her son also agreed. Mgmt for chronic sinusitis is nasal steroid spray. Continue other meds.

## 2019-04-04 RX ORDER — CITALOPRAM 20 MG/1
TABLET, FILM COATED ORAL
Qty: 90 TABLET | Refills: 1 | Status: SHIPPED | OUTPATIENT
Start: 2019-04-04 | End: 2019-10-01 | Stop reason: SDUPTHER

## 2019-04-04 RX ORDER — PROCHLORPERAZINE MALEATE 5 MG
TABLET ORAL
Qty: 30 TABLET | Refills: 1 | Status: SHIPPED | OUTPATIENT
Start: 2019-04-04 | End: 2020-12-30

## 2019-04-11 LAB
ALBUMIN SERPL-MCNC: 4.4 G/DL (ref 3.6–5.1)
ALBUMIN/GLOB SERPL: 1.6 (CALC) (ref 1–2.5)
ALP SERPL-CCNC: 51 U/L (ref 33–130)
ALT SERPL-CCNC: 12 U/L (ref 6–29)
AST SERPL-CCNC: 22 U/L (ref 10–35)
BILIRUB SERPL-MCNC: 0.5 MG/DL (ref 0.2–1.2)
BUN SERPL-MCNC: 17 MG/DL (ref 7–25)
BUN/CREAT SERPL: NORMAL (CALC) (ref 6–22)
CALCIUM SERPL-MCNC: 9.8 MG/DL (ref 8.6–10.4)
CHLORIDE SERPL-SCNC: 103 MMOL/L (ref 98–110)
CO2 SERPL-SCNC: 24 MMOL/L (ref 20–32)
CREAT SERPL-MCNC: 0.72 MG/DL (ref 0.6–0.88)
ERYTHROCYTE [DISTWIDTH] IN BLOOD BY AUTOMATED COUNT: 13.1 % (ref 11–15)
GLOBULIN SER CALC-MCNC: 2.7 G/DL (CALC) (ref 1.9–3.7)
GLUCOSE SERPL-MCNC: 87 MG/DL (ref 65–99)
HCT VFR BLD AUTO: 40.4 % (ref 35–45)
HGB BLD-MCNC: 13.3 G/DL (ref 11.7–15.5)
MCH RBC QN AUTO: 31.8 PG (ref 27–33)
MCHC RBC AUTO-ENTMCNC: 32.9 G/DL (ref 32–36)
MCV RBC AUTO: 96.7 FL (ref 80–100)
PLATELET # BLD AUTO: 251 THOUSAND/UL (ref 140–400)
PMV BLD REES-ECKER: 9.9 FL (ref 7.5–12.5)
POTASSIUM SERPL-SCNC: 4.1 MMOL/L (ref 3.5–5.3)
PROT SERPL-MCNC: 7.1 G/DL (ref 6.1–8.1)
QUEST EGFR NON-AFR. AMERICAN: 79 ML/MIN/1.73M2
RBC # BLD AUTO: 4.18 MILLION/UL (ref 3.8–5.1)
SODIUM SERPL-SCNC: 139 MMOL/L (ref 135–146)
T4 FREE SERPL-MCNC: 1.3 NG/DL (ref 0.8–1.8)
TSH SERPL-ACNC: 4.2 MIU/L (ref 0.4–4.5)
WBC # BLD AUTO: 5.9 THOUSAND/UL (ref 3.8–10.8)

## 2019-04-18 ENCOUNTER — OFFICE VISIT (OUTPATIENT)
Dept: FAMILY MEDICINE | Facility: CLINIC | Age: 84
End: 2019-04-18
Payer: MEDICARE

## 2019-04-18 VITALS
OXYGEN SATURATION: 97 % | DIASTOLIC BLOOD PRESSURE: 68 MMHG | HEART RATE: 66 BPM | HEIGHT: 60 IN | WEIGHT: 97 LBS | SYSTOLIC BLOOD PRESSURE: 102 MMHG | RESPIRATION RATE: 16 BRPM | BODY MASS INDEX: 19.04 KG/M2

## 2019-04-18 DIAGNOSIS — J32.9 CHRONIC SINUSITIS, UNSPECIFIED LOCATION: ICD-10-CM

## 2019-04-18 DIAGNOSIS — N18.2 STAGE 2 CHRONIC KIDNEY DISEASE: ICD-10-CM

## 2019-04-18 DIAGNOSIS — J45.20 MILD INTERMITTENT ASTHMA WITHOUT COMPLICATION: ICD-10-CM

## 2019-04-18 DIAGNOSIS — M15.9 OSTEOARTHRITIS INVOLVING MULTIPLE JOINTS ON BOTH SIDES OF BODY: ICD-10-CM

## 2019-04-18 DIAGNOSIS — F41.9 ANXIETY AND DEPRESSION: ICD-10-CM

## 2019-04-18 DIAGNOSIS — F32.A ANXIETY AND DEPRESSION: ICD-10-CM

## 2019-04-18 DIAGNOSIS — E06.3 HYPOTHYROIDISM DUE TO HASHIMOTO'S THYROIDITIS: Primary | ICD-10-CM

## 2019-04-18 PROCEDURE — 99214 OFFICE O/P EST MOD 30 MIN: CPT | Performed by: FAMILY MEDICINE

## 2019-04-18 RX ORDER — ALBUTEROL SULFATE 90 UG/1
2 INHALANT RESPIRATORY (INHALATION) EVERY 4 HOURS PRN
Qty: 1 INHALER | Refills: 1 | Status: SHIPPED | OUTPATIENT
Start: 2019-04-18 | End: 2021-10-11

## 2019-04-18 RX ORDER — BUPROPION HYDROCHLORIDE 200 MG/1
200 TABLET, EXTENDED RELEASE ORAL DAILY
Qty: 30 TABLET | Refills: 1 | Status: SHIPPED | OUTPATIENT
Start: 2019-04-18 | End: 2019-07-05

## 2019-04-18 ASSESSMENT — ENCOUNTER SYMPTOMS
COUGH: 1
MYALGIAS: 1

## 2019-04-18 NOTE — PATIENT INSTRUCTIONS
Increase bupropion SR to 200 mg daily in morning to help with depression  Double check to see if you are taking fexofenadine (allegra) for post nasal drip and nasal allergy symptoms.   Try reducing milk products and see if phlegm improves

## 2019-04-18 NOTE — PROGRESS NOTES
Patient ID: Lida Foley is a 83 y.o. female.        Subjective     Here for f/u  Increase in her sinus drainage and cough. Feels tired at night. Periodic cough. She feels flonase did not help enough.   Depression persists. Sleeps a lot.           The following have been reviewed and updated as appropriate in this visit:       Patient Active Problem List   Diagnosis   • Anxiety and depression   • Stage 2 chronic kidney disease   • Degenerative disc disease, lumbar   • Gout   • Hypothyroidism due to Hashimoto's thyroiditis   • Mild intermittent asthma without complication   • GERD without esophagitis   • Osteoarthritis involving multiple joints on both sides of body   • Chronic sinusitis   • MRSA (methicillin resistant Staphylococcus aureus)   • PAC (premature atrial contraction)   • Hyperlipidemia   • Gastroparesis   • DVT (deep venous thrombosis) (CMS/HCC) (HCC)   • Age-related osteoporosis without current pathological fracture   • Weight loss, unintentional   • Pruritus   • Chronic vasomotor rhinitis   • Stress disorder, post traumatic   • Nausea       Current Outpatient Prescriptions:   •  citalopram (celeXA) 20 mg tablet, take 1 tablet by mouth once daily, Disp: 90 tablet, Rfl: 1  •  conjugated estrogens (PREMARIN) 0.625 mg/gram vaginal cream, apply small pea sized amount to external vaginal/urethral area daily for 2 weeks then 2x/week, Disp: , Rfl:   •  diclofenac sodium (VOLTAREN) 1 % topical gel, apply 4 grams to affected area four times a day, Disp: , Rfl: 0  •  estradiol (ESTRACE) 0.01 % (0.1 mg/gram) vaginal cream, insert (1G)  by vaginal route 2x/week, Disp: , Rfl:   •  fexofenadine (ALLEGRA) 180 mg tablet, Take 180 mg by mouth daily., Disp: , Rfl:   •  fluticasone (FLONASE) 50 mcg/actuation nasal spray, Administer 1 spray into each nostril 2 (two) times a day., Disp: 1 Bottle, Rfl: 11  •  ipratropium (ATROVENT) 42 mcg (0.06 %) nasal spray, Administer 2 sprays into each nostril 3 (three) times a day.,  Disp: 15 mL, Rfl: 12  •  levothyroxine (SYNTHROID) 75 mcg tablet, Take 1 tablet (75 mcg total) by mouth daily., Disp: 30 tablet, Rfl: 3  •  magnesium oxide 400 mg capsule, 400 mg., Disp: , Rfl:   •  montelukast (SINGULAIR) 10 mg tablet, Take 1 tablet (10 mg total) by mouth nightly., Disp: 90 tablet, Rfl: 1  •  mupirocin (BACTROBAN) 2 % ointment, 2 %., Disp: , Rfl:   •  omeprazole (PriLOSEC) 40 mg capsule, Take 40 mg by mouth daily., Disp: , Rfl: 0  •  ondansetron (ZOFRAN) 4 mg tablet, Take 1 tablet (4 mg total) by mouth every 8 (eight) hours as needed for nausea or vomiting., Disp: 30 tablet, Rfl: 2  •  prochlorperazine (COMPAZINE) 5 mg tablet, take 1 tablet by mouth every 8 hours if needed for nausea and vomiting, Disp: 30 tablet, Rfl: 1  •  zoledronic acid (RECLAST) IVPB, Infuse 5 mg into a venous catheter once., Disp: , Rfl:   •  albuterol HFA (PROAIR HFA) 90 mcg/actuation inhaler, Inhale 2 puffs every 4 (four) hours as needed for wheezing., Disp: 1 Inhaler, Rfl: 1  •  buPROPion SR (WELLBUTRIN SR) 200 mg 12 hr tablet, Take 1 tablet (200 mg total) by mouth daily., Disp: 30 tablet, Rfl: 1    Allergies   Allergen Reactions   • Iodine And Iodide Containing Products      Other reaction(s): Syncope   Iodine containing   • Ciprofloxacin      Other reaction(s): Vomiting   • Codeine      Other reaction(s): abdominal pain   • Iodinated Contrast- Oral And Iv Dye      Other reaction(s): Anaphylaxis   • Latex      Other reaction(s): Hives   • Moxifloxacin Nausea Only     Other reaction(s): Vomiting   • Moxifloxacin Hcl      Other reaction(s): hives   • Nsaids (Non-Steroidal Anti-Inflammatory Drug)      Other reaction(s): kidney failure   • Oxycodone-Acetaminophen      Other reaction(s): GI Upset, Hallucinations    • Penicillins      Other reaction(s): Hives   • Shellfish Containing Products      Other reaction(s): GI Upset   • Sulfa (Sulfonamide Antibiotics)      Other reaction(s): Hives     Review of Systems   HENT: Positive  for postnasal drip.    Respiratory: Positive for cough.    Musculoskeletal: Positive for myalgias.         Objective     Vitals:    04/18/19 1400   BP: 102/68   BP Location: Left upper arm   Patient Position: Sitting   Pulse: 66   Resp: 16   SpO2: 97%   Weight: 44 kg (97 lb)   Height: 1.524 m (5')     Physical Exam   Constitutional: She appears well-developed and well-nourished.   HENT:   Head: Normocephalic.   Right Ear: External ear normal.   Left Ear: External ear normal.   Mouth/Throat: Oropharynx is clear and moist.   Eyes: Pupils are equal, round, and reactive to light.   Neck: Neck supple.   Cardiovascular: Normal rate, regular rhythm and normal heart sounds.    Pulmonary/Chest: Effort normal and breath sounds normal.   Abdominal: Soft. There is no tenderness.   Musculoskeletal: She exhibits no edema.   Skin: Skin is warm and dry.   Nursing note and vitals reviewed.      Assessment/Plan       Problem List Items Addressed This Visit        Respiratory    Mild intermittent asthma without complication    Relevant Medications    albuterol HFA (PROAIR HFA) 90 mcg/actuation inhaler    Chronic sinusitis       Genitourinary    Stage 2 chronic kidney disease       Musculoskeletal    Osteoarthritis involving multiple joints on both sides of body     She is requesting order for walker b/c [employer]. I think it has been too short to have medicare cover. She is going to ask her son to look at [employer].              Endocrine/Metabolic    Hypothyroidism due to Hashimoto's thyroiditis - Primary       Other    Anxiety and depression    Relevant Medications    buPROPion SR (WELLBUTRIN SR) 200 mg 12 hr tablet      increase bupropion sr 200 daily  F/u 1 month

## 2019-06-03 RX ORDER — ALLOPURINOL 300 MG/1
TABLET ORAL
Qty: 90 TABLET | Refills: 1 | Status: SHIPPED | OUTPATIENT
Start: 2019-06-03 | End: 2019-07-05

## 2019-06-03 NOTE — TELEPHONE ENCOUNTER
Medicine Refill Request    Last Office Visit: 4/18/2019  Next Office Visit: Visit date not found    DR. López patient    Current Outpatient Prescriptions:   •  albuterol HFA (PROAIR HFA) 90 mcg/actuation inhaler, Inhale 2 puffs every 4 (four) hours as needed for wheezing., Disp: 1 Inhaler, Rfl: 1  •  buPROPion SR (WELLBUTRIN SR) 200 mg 12 hr tablet, Take 1 tablet (200 mg total) by mouth daily., Disp: 30 tablet, Rfl: 1  •  citalopram (celeXA) 20 mg tablet, take 1 tablet by mouth once daily, Disp: 90 tablet, Rfl: 1  •  conjugated estrogens (PREMARIN) 0.625 mg/gram vaginal cream, apply small pea sized amount to external vaginal/urethral area daily for 2 weeks then 2x/week, Disp: , Rfl:   •  diclofenac sodium (VOLTAREN) 1 % topical gel, apply 4 grams to affected area four times a day, Disp: , Rfl: 0  •  estradiol (ESTRACE) 0.01 % (0.1 mg/gram) vaginal cream, insert (1G)  by vaginal route 2x/week, Disp: , Rfl:   •  fexofenadine (ALLEGRA) 180 mg tablet, Take 180 mg by mouth daily., Disp: , Rfl:   •  fluticasone (FLONASE) 50 mcg/actuation nasal spray, Administer 1 spray into each nostril 2 (two) times a day., Disp: 1 Bottle, Rfl: 11  •  ipratropium (ATROVENT) 42 mcg (0.06 %) nasal spray, Administer 2 sprays into each nostril 3 (three) times a day., Disp: 15 mL, Rfl: 12  •  levothyroxine (SYNTHROID) 75 mcg tablet, Take 1 tablet (75 mcg total) by mouth daily., Disp: 30 tablet, Rfl: 3  •  magnesium oxide 400 mg capsule, 400 mg., Disp: , Rfl:   •  mupirocin (BACTROBAN) 2 % ointment, 2 %., Disp: , Rfl:   •  omeprazole (PriLOSEC) 40 mg capsule, Take 40 mg by mouth daily., Disp: , Rfl: 0  •  ondansetron (ZOFRAN) 4 mg tablet, Take 1 tablet (4 mg total) by mouth every 8 (eight) hours as needed for nausea or vomiting., Disp: 30 tablet, Rfl: 2  •  prochlorperazine (COMPAZINE) 5 mg tablet, take 1 tablet by mouth every 8 hours if needed for nausea and vomiting, Disp: 30 tablet, Rfl: 1  •  zoledronic acid (RECLAST) IVPB, Infuse 5 mg  into a venous catheter once., Disp: , Rfl:       BP Readings from Last 3 Encounters:   04/18/19 102/68   02/15/19 118/68   01/10/19 138/60       Recent Lab results:  No results found for: CHOL, No results found for: HDL, No results found for: LDLCALC, No results found for: TRIG     Lab Results   Component Value Date    GLUCOSE 87 04/10/2019   , No results found for: HGBA1C      Lab Results   Component Value Date    CREATININE 0.72 04/10/2019       Lab Results   Component Value Date    TSH 4.20 04/10/2019

## 2019-07-01 ENCOUNTER — TRANSCRIBE ORDERS (OUTPATIENT)
Dept: SCHEDULING | Age: 84
End: 2019-07-01

## 2019-07-01 DIAGNOSIS — M54.50 LOW BACK PAIN: Primary | ICD-10-CM

## 2019-07-05 ENCOUNTER — OFFICE VISIT (OUTPATIENT)
Dept: FAMILY MEDICINE | Facility: CLINIC | Age: 84
End: 2019-07-05
Payer: MEDICARE

## 2019-07-05 VITALS
HEART RATE: 62 BPM | HEIGHT: 60 IN | WEIGHT: 99 LBS | DIASTOLIC BLOOD PRESSURE: 64 MMHG | BODY MASS INDEX: 19.44 KG/M2 | SYSTOLIC BLOOD PRESSURE: 100 MMHG | TEMPERATURE: 98.4 F

## 2019-07-05 DIAGNOSIS — N18.2 STAGE 2 CHRONIC KIDNEY DISEASE: ICD-10-CM

## 2019-07-05 DIAGNOSIS — E06.3 HYPOTHYROIDISM DUE TO HASHIMOTO'S THYROIDITIS: ICD-10-CM

## 2019-07-05 DIAGNOSIS — K31.84 GASTROPARESIS: ICD-10-CM

## 2019-07-05 DIAGNOSIS — M15.9 OSTEOARTHRITIS INVOLVING MULTIPLE JOINTS ON BOTH SIDES OF BODY: ICD-10-CM

## 2019-07-05 DIAGNOSIS — R11.0 NAUSEA: ICD-10-CM

## 2019-07-05 DIAGNOSIS — K21.9 GERD WITHOUT ESOPHAGITIS: Primary | ICD-10-CM

## 2019-07-05 DIAGNOSIS — Z79.899 MEDICATION MANAGEMENT: ICD-10-CM

## 2019-07-05 LAB
ALBUMIN SERPL-MCNC: 4 G/DL (ref 3.4–5)
ALP SERPL-CCNC: 83 IU/L (ref 35–126)
ALT SERPL-CCNC: 122 IU/L (ref 11–54)
ANION GAP SERPL CALC-SCNC: 8 MEQ/L (ref 3–15)
AST SERPL-CCNC: 79 IU/L (ref 15–41)
BASOPHILS # BLD: 0.03 K/UL (ref 0.01–0.1)
BASOPHILS NFR BLD: 0.4 %
BILIRUB SERPL-MCNC: 0.5 MG/DL (ref 0.3–1.2)
BUN SERPL-MCNC: 12 MG/DL (ref 8–20)
CALCIUM SERPL-MCNC: 9.1 MG/DL (ref 8.9–10.3)
CHLORIDE SERPL-SCNC: 95 MEQ/L (ref 98–109)
CO2 SERPL-SCNC: 25 MEQ/L (ref 22–32)
CREAT SERPL-MCNC: 0.6 MG/DL
DIFFERENTIAL METHOD BLD: NORMAL
EOSINOPHIL # BLD: 0.16 K/UL (ref 0.04–0.36)
EOSINOPHIL NFR BLD: 2 %
ERYTHROCYTE [DISTWIDTH] IN BLOOD BY AUTOMATED COUNT: 13.2 % (ref 11.7–14.4)
GFR SERPL CREATININE-BSD FRML MDRD: >60 ML/MIN/1.73M*2
GLUCOSE SERPL-MCNC: 87 MG/DL (ref 70–99)
HCT VFR BLDCO AUTO: 36.4 %
HGB BLD-MCNC: 12 G/DL
IMM GRANULOCYTES # BLD AUTO: 0.03 K/UL (ref 0–0.08)
IMM GRANULOCYTES NFR BLD AUTO: 0.4 %
LYMPHOCYTES # BLD: 2.2 K/UL (ref 1.2–3.5)
LYMPHOCYTES NFR BLD: 28.2 %
MAGNESIUM SERPL-MCNC: 1.7 MG/DL (ref 1.8–2.5)
MCH RBC QN AUTO: 31.9 PG (ref 28–33.2)
MCHC RBC AUTO-ENTMCNC: 33 G/DL (ref 32.2–35.5)
MCV RBC AUTO: 96.8 FL (ref 83–98)
MONOCYTES # BLD: 0.75 K/UL (ref 0.28–0.8)
MONOCYTES NFR BLD: 9.6 %
NEUTROPHILS # BLD: 4.64 K/UL (ref 1.7–7)
NEUTS SEG NFR BLD: 59.4 %
NRBC BLD-RTO: 0 %
PDW BLD AUTO: 9.5 FL (ref 9.4–12.3)
PLATELET # BLD AUTO: 251 K/UL
POTASSIUM SERPL-SCNC: 4.5 MEQ/L (ref 3.6–5.1)
PROT SERPL-MCNC: 6.3 G/DL (ref 6–8.2)
RBC # BLD AUTO: 3.76 M/UL (ref 3.93–5.22)
SODIUM SERPL-SCNC: 128 MEQ/L (ref 136–144)
TSH SERPL DL<=0.05 MIU/L-ACNC: 4.77 MIU/L (ref 0.34–5.6)
VIT B12 SERPL-MCNC: >1500 PG/ML (ref 180–914)
WBC # BLD AUTO: 7.81 K/UL

## 2019-07-05 PROCEDURE — 82607 VITAMIN B-12: CPT | Performed by: FAMILY MEDICINE

## 2019-07-05 PROCEDURE — 83735 ASSAY OF MAGNESIUM: CPT | Performed by: FAMILY MEDICINE

## 2019-07-05 PROCEDURE — 84443 ASSAY THYROID STIM HORMONE: CPT | Performed by: FAMILY MEDICINE

## 2019-07-05 PROCEDURE — 99214 OFFICE O/P EST MOD 30 MIN: CPT | Performed by: FAMILY MEDICINE

## 2019-07-05 PROCEDURE — 85025 COMPLETE CBC W/AUTO DIFF WBC: CPT | Performed by: FAMILY MEDICINE

## 2019-07-05 PROCEDURE — 80053 COMPREHEN METABOLIC PANEL: CPT | Performed by: FAMILY MEDICINE

## 2019-07-05 PROCEDURE — 36415 COLL VENOUS BLD VENIPUNCTURE: CPT | Performed by: FAMILY MEDICINE

## 2019-07-05 RX ORDER — PANTOPRAZOLE SODIUM 40 MG/1
40 TABLET, DELAYED RELEASE ORAL DAILY
Qty: 30 TABLET | Refills: 3 | Status: SHIPPED | OUTPATIENT
Start: 2019-07-05 | End: 2020-01-09

## 2019-07-05 RX ORDER — PANTOPRAZOLE SODIUM 40 MG/1
40 TABLET, DELAYED RELEASE ORAL DAILY
Qty: 30 TABLET | Refills: 3 | Status: SHIPPED | OUTPATIENT
Start: 2019-07-05 | End: 2019-07-05 | Stop reason: SDUPTHER

## 2019-07-05 ASSESSMENT — ENCOUNTER SYMPTOMS
DYSURIA: 0
CONSTIPATION: 0

## 2019-07-05 NOTE — PATIENT INSTRUCTIONS
Check [employer] for coupons for medications. I am stopping omeprazole and switching you to  Pantoprazole 40 mg daily and take on an empty stomach.

## 2019-07-05 NOTE — PROGRESS NOTES
Patient ID: Lida Foley is a 84 y.o. female.        Subjective     Having nausea and bitter taste in her mouth all the time. Takes omeprazole in am with her breakfast. No black stool. Eating toast, egg whites. Feels some shortness of breath. Drinks lot of water. She had stopped bupropion b/c it upset her stomach. Sleeping fine. Feels chronic sinus pressure.             The following have been reviewed and updated as appropriate in this visit:  Allergies  Meds  Problems       Patient Active Problem List   Diagnosis   • Anxiety and depression   • Stage 2 chronic kidney disease   • Degenerative disc disease, lumbar   • Gout   • Hypothyroidism due to Hashimoto's thyroiditis   • Mild intermittent asthma without complication   • GERD without esophagitis   • Osteoarthritis involving multiple joints on both sides of body   • Chronic sinusitis   • MRSA (methicillin resistant Staphylococcus aureus)   • PAC (premature atrial contraction)   • Hyperlipidemia   • Gastroparesis   • DVT (deep venous thrombosis) (CMS/HCC) ([facility])   • Age-related osteoporosis without current pathological fracture   • Weight loss, unintentional   • Pruritus   • Chronic vasomotor rhinitis   • Stress disorder, post traumatic   • Nausea       Outpatient Encounter Prescriptions as of 7/5/2019:   •  allopurinol (ZYLOPRIM) 300 mg tablet, Take 1 tablet (300 mg total) by mouth once daily.  •  citalopram (celeXA) 20 mg tablet, take 1 tablet by mouth once daily  •  conjugated estrogens (PREMARIN) 0.625 mg/gram vaginal cream, apply small pea sized amount to external vaginal/urethral area daily for 2 weeks then 2x/week  •  diclofenac sodium (VOLTAREN) 1 % topical gel, apply 4 grams to affected area four times a day  •  estradiol (ESTRACE) 0.01 % (0.1 mg/gram) vaginal cream, insert (1G)  by vaginal route 2x/week  •  fexofenadine (ALLEGRA) 180 mg tablet, Take 180 mg by mouth daily.  •  fluticasone (FLONASE) 50 mcg/actuation nasal spray, Administer 1 spray  into each nostril 2 (two) times a day.  •  ipratropium (ATROVENT) 42 mcg (0.06 %) nasal spray, Administer 2 sprays into each nostril 3 (three) times a day.  •  levothyroxine (SYNTHROID) 75 mcg tablet, Take 1 tablet (75 mcg total) by mouth daily.  •  magnesium oxide 400 mg capsule, 400 mg.  •  mupirocin (BACTROBAN) 2 % ointment, 2 %.  •  prochlorperazine (COMPAZINE) 5 mg tablet, take 1 tablet by mouth every 8 hours if needed for nausea and vomiting  •  zoledronic acid (RECLAST) IVPB, Infuse 5 mg into a venous catheter once.  •  [DISCONTINUED] allopurinol (ZYLOPRIM) 300 mg tablet, take 1 tablet by mouth once daily  •  [DISCONTINUED] buPROPion SR (WELLBUTRIN SR) 200 mg 12 hr tablet, Take 1 tablet (200 mg total) by mouth daily.  •  [DISCONTINUED] omeprazole (PriLOSEC) 40 mg capsule, Take 40 mg by mouth daily.  •  [DISCONTINUED] ondansetron (ZOFRAN) 4 mg tablet, Take 1 tablet (4 mg total) by mouth every 8 (eight) hours as needed for nausea or vomiting.  •  albuterol HFA (PROAIR HFA) 90 mcg/actuation inhaler, Inhale 2 puffs every 4 (four) hours as needed for wheezing.  •  [] clotrimazole (LOTRIMIN) 1 % topical cream, Apply topically 2 (two) times a day.  •  [] montelukast (SINGULAIR) 10 mg tablet, Take 1 tablet (10 mg total) by mouth nightly.  •  pantoprazole (PROTONIX) 40 mg EC tablet, Take 1 tablet (40 mg total) by mouth daily.  •  [DISCONTINUED] pantoprazole (PROTONIX) 40 mg EC tablet, Take 1 tablet (40 mg total) by mouth daily.    Allergies   Allergen Reactions   • Iodine And Iodide Containing Products      Other reaction(s): Syncope   Iodine containing   • Ciprofloxacin      Other reaction(s): Vomiting   • Codeine      Other reaction(s): abdominal pain   • Iodinated Contrast- Oral And Iv Dye      Other reaction(s): Anaphylaxis   • Latex      Other reaction(s): Hives   • Moxifloxacin Nausea Only     Other reaction(s): Vomiting   • Moxifloxacin Hcl      Other reaction(s): hives   • Nsaids (Non-Steroidal  Anti-Inflammatory Drug)      Other reaction(s): kidney failure   • Oxycodone-Acetaminophen      Other reaction(s): GI Upset, Hallucinations    • Penicillins      Other reaction(s): Hives   • Shellfish Containing Products      Other reaction(s): GI Upset   • Sulfa (Sulfonamide Antibiotics)      Other reaction(s): Hives     Review of Systems   Gastrointestinal: Negative for constipation.   Genitourinary: Negative for dysuria.         Objective     Vitals:    07/05/19 1525   BP: 100/64   BP Location: Left upper arm   Patient Position: Sitting   Pulse: 62   Temp: 36.9 °C (98.4 °F)   TempSrc: Oral   Weight: 44.9 kg (99 lb)   Height: 1.524 m (5')     Physical Exam   Constitutional: She appears well-developed and well-nourished.   HENT:   Head: Normocephalic.   Right Ear: External ear normal.   Left Ear: External ear normal.   Mouth/Throat: Oropharynx is clear and moist.   Eyes: Pupils are equal, round, and reactive to light.   Neck: Neck supple. No thyromegaly present.   Cardiovascular: Normal rate, regular rhythm and normal heart sounds.    Pulmonary/Chest: Effort normal and breath sounds normal.   Abdominal: Soft. She exhibits no distension. There is no hepatosplenomegaly. There is no tenderness. There is no guarding.   Musculoskeletal: She exhibits no edema.   Lymphadenopathy:     She has no cervical adenopathy.   Neurological: She is alert. She exhibits normal muscle tone.   Skin: Skin is warm and dry.   Nursing note and vitals reviewed.      Assessment/Plan       Problem List Items Addressed This Visit        Digestive    GERD without esophagitis - Primary    Relevant Medications    pantoprazole (PROTONIX) 40 mg EC tablet    Gastroparesis    Relevant Medications    pantoprazole (PROTONIX) 40 mg EC tablet    Nausea       Genitourinary    Stage 2 chronic kidney disease    Relevant Orders    Comprehensive metabolic panel    CBC and Differential       Musculoskeletal    Osteoarthritis involving multiple joints on both  sides of body       Endocrine/Metabolic    Hypothyroidism due to Hashimoto's thyroiditis    Relevant Orders    TSH 3rd Generation      Other Visit Diagnoses     Medication management        Relevant Orders    Vitamin B12    Magnesium      change omeprazole to protonix  Check labs

## 2019-07-10 ENCOUNTER — TELEPHONE (OUTPATIENT)
Dept: FAMILY MEDICINE | Facility: CLINIC | Age: 84
End: 2019-07-10

## 2019-07-10 PROBLEM — E87.1 HYPONATREMIA: Status: ACTIVE | Noted: 2019-07-10

## 2019-07-10 PROBLEM — E83.42 HYPOMAGNESEMIA: Status: ACTIVE | Noted: 2019-07-10

## 2019-07-10 PROBLEM — R74.8 ELEVATED LIVER ENZYMES: Status: ACTIVE | Noted: 2019-07-10

## 2019-07-10 NOTE — TELEPHONE ENCOUNTER
She has some electrolyte abnormalities (low magnesium and low sodium) with some elevated liver enzymes. Double check if she is still taking magnesium oxide. If not we need to send rx for one daily to her pharmacy. As far as sodium, ask how much plain water she is drinking (If 6 glasses or less ok) If higher than that needs to reduce. Add some increase salt to her foods to help. I would like to see her Friday or early next week. We will need to plan some other testing.

## 2019-07-10 NOTE — TELEPHONE ENCOUNTER
"Spoke with patient's son Jet (on PHI)- patient is already taking Mg Ox 400mg one daily. He reports she drinks a \"ton\" of plain water.  Advised that 6 glasses of water or less is ok. She will add some salt to her food.  Appointment made for early next week for follow up  "

## 2019-07-11 ENCOUNTER — HOSPITAL ENCOUNTER (OUTPATIENT)
Dept: RADIOLOGY | Facility: HOSPITAL | Age: 84
Discharge: HOME | End: 2019-07-11
Attending: ORTHOPAEDIC SURGERY
Payer: MEDICARE

## 2019-07-11 DIAGNOSIS — M54.50 LOW BACK PAIN: ICD-10-CM

## 2019-07-11 PROCEDURE — 72131 CT LUMBAR SPINE W/O DYE: CPT

## 2019-07-16 ENCOUNTER — OFFICE VISIT (OUTPATIENT)
Dept: FAMILY MEDICINE | Facility: CLINIC | Age: 84
End: 2019-07-16
Payer: MEDICARE

## 2019-07-16 VITALS
HEART RATE: 62 BPM | BODY MASS INDEX: 19.24 KG/M2 | DIASTOLIC BLOOD PRESSURE: 60 MMHG | OXYGEN SATURATION: 98 % | RESPIRATION RATE: 20 BRPM | SYSTOLIC BLOOD PRESSURE: 120 MMHG | WEIGHT: 98 LBS | TEMPERATURE: 98.3 F | HEIGHT: 60 IN

## 2019-07-16 DIAGNOSIS — K21.9 GERD WITHOUT ESOPHAGITIS: Primary | ICD-10-CM

## 2019-07-16 DIAGNOSIS — E83.42 HYPOMAGNESEMIA: ICD-10-CM

## 2019-07-16 DIAGNOSIS — E87.1 HYPONATREMIA: ICD-10-CM

## 2019-07-16 DIAGNOSIS — R93.89 ENDOMETRIAL THICKENING ON ULTRASOUND: ICD-10-CM

## 2019-07-16 DIAGNOSIS — R74.8 ELEVATED LIVER ENZYMES: ICD-10-CM

## 2019-07-16 LAB
ALBUMIN SERPL-MCNC: 3.7 G/DL (ref 3.4–5)
ALP SERPL-CCNC: 47 IU/L (ref 35–126)
ALT SERPL-CCNC: 24 IU/L (ref 11–54)
ANION GAP SERPL CALC-SCNC: 9 MEQ/L (ref 3–15)
AST SERPL-CCNC: 25 IU/L (ref 15–41)
BILIRUB SERPL-MCNC: 0.7 MG/DL (ref 0.3–1.2)
BUN SERPL-MCNC: 10 MG/DL (ref 8–20)
CALCIUM SERPL-MCNC: 9 MG/DL (ref 8.9–10.3)
CHLORIDE SERPL-SCNC: 102 MEQ/L (ref 98–109)
CO2 SERPL-SCNC: 26 MEQ/L (ref 22–32)
CREAT SERPL-MCNC: 0.6 MG/DL
FERRITIN SERPL-MCNC: 42 NG/ML (ref 11–250)
GFR SERPL CREATININE-BSD FRML MDRD: >60 ML/MIN/1.73M*2
GLUCOSE SERPL-MCNC: 87 MG/DL (ref 70–99)
IRON SATN MFR SERPL: 26 % (ref 15–45)
IRON SERPL-MCNC: 91 UG/DL (ref 35–150)
POTASSIUM SERPL-SCNC: 4.3 MEQ/L (ref 3.6–5.1)
PROT SERPL-MCNC: 5.8 G/DL (ref 6–8.2)
SODIUM SERPL-SCNC: 137 MEQ/L (ref 136–144)
TIBC SERPL-MCNC: 347 UG/DL (ref 270–460)
UIBC SERPL-MCNC: 256 UG/DL (ref 180–360)

## 2019-07-16 PROCEDURE — 99214 OFFICE O/P EST MOD 30 MIN: CPT | Performed by: FAMILY MEDICINE

## 2019-07-16 PROCEDURE — 83516 IMMUNOASSAY NONANTIBODY: CPT | Mod: 59 | Performed by: FAMILY MEDICINE

## 2019-07-16 PROCEDURE — 83516 IMMUNOASSAY NONANTIBODY: CPT | Performed by: FAMILY MEDICINE

## 2019-07-16 PROCEDURE — 80053 COMPREHEN METABOLIC PANEL: CPT | Performed by: FAMILY MEDICINE

## 2019-07-16 PROCEDURE — 83540 ASSAY OF IRON: CPT | Performed by: FAMILY MEDICINE

## 2019-07-16 PROCEDURE — 82728 ASSAY OF FERRITIN: CPT | Performed by: FAMILY MEDICINE

## 2019-07-16 ASSESSMENT — ENCOUNTER SYMPTOMS
NAUSEA: 0
BACK PAIN: 0
ABDOMINAL PAIN: 0
CHILLS: 0
CONSTIPATION: 0
DIARRHEA: 0
FREQUENCY: 0
FEVER: 0

## 2019-07-16 NOTE — PROGRESS NOTES
Patient ID: Lida Foley is a 84 y.o. female.        Subjective     HPI     Patient presents today for a follow up. Pt recent labs showed some electrolyte abnormalities and elevated liver enzymes.  Pt reports she has been drinking more water and Gatorade. Additionally Pt nausea has been relieved with pantoprazole Her appetite has been fine. Denies abd pain, back pain or,diarrhea.   Pt takes 3g tylenol a day.   I looked over imaging and discussed previous pelvic u/s. Reviewed prior notes around that time. She saw gyn, endometrial sampling attempted but unsuccessful. Plan was to include repeating an u/s. She thought she did it but I do not see a report.     The following have been reviewed and updated as appropriate in this visit:  Allergies  Meds  Problems       Patient Active Problem List   Diagnosis   • Anxiety and depression   • Stage 2 chronic kidney disease   • Degenerative disc disease, lumbar   • Gout   • Hypothyroidism due to Hashimoto's thyroiditis   • Mild intermittent asthma without complication   • GERD without esophagitis   • Osteoarthritis involving multiple joints on both sides of body   • Chronic sinusitis   • MRSA (methicillin resistant Staphylococcus aureus)   • PAC (premature atrial contraction)   • Hyperlipidemia   • Gastroparesis   • DVT (deep venous thrombosis) (CMS/HCC) ([facility])   • Age-related osteoporosis without current pathological fracture   • Weight loss, unintentional   • Pruritus   • Chronic vasomotor rhinitis   • Stress disorder, post traumatic   • Nausea   • Hyponatremia   • Hypomagnesemia   • Elevated liver enzymes       Outpatient Encounter Prescriptions as of 7/16/2019:   •  allopurinol (ZYLOPRIM) 300 mg tablet, Take 1 tablet (300 mg total) by mouth once daily.  •  citalopram (celeXA) 20 mg tablet, take 1 tablet by mouth once daily  •  conjugated estrogens (PREMARIN) 0.625 mg/gram vaginal cream, apply small pea sized amount to external vaginal/urethral area daily for 2 weeks  then 2x/week  •  diclofenac sodium (VOLTAREN) 1 % topical gel, apply 4 grams to affected area four times a day  •  estradiol (ESTRACE) 0.01 % (0.1 mg/gram) vaginal cream, insert (1G)  by vaginal route 2x/week  •  fexofenadine (ALLEGRA) 180 mg tablet, Take 180 mg by mouth daily.  •  fluticasone (FLONASE) 50 mcg/actuation nasal spray, Administer 1 spray into each nostril 2 (two) times a day.  •  ipratropium (ATROVENT) 42 mcg (0.06 %) nasal spray, Administer 2 sprays into each nostril 3 (three) times a day.  •  levothyroxine (SYNTHROID) 75 mcg tablet, Take 1 tablet (75 mcg total) by mouth daily.  •  magnesium oxide 400 mg capsule, 400 mg.  •  mupirocin (BACTROBAN) 2 % ointment, 2 %.  •  pantoprazole (PROTONIX) 40 mg EC tablet, Take 1 tablet (40 mg total) by mouth daily.  •  prochlorperazine (COMPAZINE) 5 mg tablet, take 1 tablet by mouth every 8 hours if needed for nausea and vomiting  •  zoledronic acid (RECLAST) IVPB, Infuse 5 mg into a venous catheter once.  •  albuterol HFA (PROAIR HFA) 90 mcg/actuation inhaler, Inhale 2 puffs every 4 (four) hours as needed for wheezing.  •  [] clotrimazole (LOTRIMIN) 1 % topical cream, Apply topically 2 (two) times a day.  •  [] montelukast (SINGULAIR) 10 mg tablet, Take 1 tablet (10 mg total) by mouth nightly.    Allergies   Allergen Reactions   • Iodine And Iodide Containing Products      Other reaction(s): Syncope   Iodine containing   • Ciprofloxacin      Other reaction(s): Vomiting   • Codeine      Other reaction(s): abdominal pain   • Iodinated Contrast- Oral And Iv Dye      Other reaction(s): Anaphylaxis   • Latex      Other reaction(s): Hives   • Moxifloxacin Nausea Only     Other reaction(s): Vomiting   • Moxifloxacin Hcl      Other reaction(s): hives   • Nsaids (Non-Steroidal Anti-Inflammatory Drug)      Other reaction(s): kidney failure   • Oxycodone-Acetaminophen      Other reaction(s): GI Upset, Hallucinations    • Penicillins      Other reaction(s):  Hives   • Shellfish Containing Products      Other reaction(s): GI Upset   • Sulfa (Sulfonamide Antibiotics)      Other reaction(s): Hives     Review of Systems   Constitutional: Negative for chills and fever.   Gastrointestinal: Negative for abdominal pain, constipation, diarrhea and nausea.   Genitourinary: Negative for frequency.   Musculoskeletal: Negative for back pain.         Objective     Vitals:    07/16/19 1403   BP: 120/60   BP Location: Left upper arm   Patient Position: Sitting   Pulse: 62   Resp: 20   Temp: 36.8 °C (98.3 °F)   TempSrc: Oral   SpO2: 98%   Weight: 44.5 kg (98 lb)   Height: 1.524 m (5')     Physical Exam   Constitutional: She appears well-developed and well-nourished.   HENT:   Head: Normocephalic.   Mouth/Throat: Oropharynx is clear and moist.   Eyes: Pupils are equal, round, and reactive to light.   Neck: Neck supple.   Cardiovascular: Normal rate, regular rhythm and normal heart sounds.    Pulmonary/Chest: Effort normal and breath sounds normal.   Skin: Skin is warm and dry.   Nursing note and vitals reviewed.      Assessment/Plan       Problem List Items Addressed This Visit        Digestive    GERD without esophagitis - Primary       Endocrine/Metabolic    Hyponatremia    Hypomagnesemia       Other    Elevated liver enzymes    Relevant Orders    Comprehensive metabolic panel (Completed)    Ferritin (Completed)    Iron and TIBC (Completed)    Tissue transglutaminase, IgA (Completed)    Gliadin antibody, IgG IgA (Completed)    ULTRASOUND ABDOMEN COMPLETE      Other Visit Diagnoses     Endometrial thickening on ultrasound        Relevant Orders    ULTRASOUND PELVIS LIMITED TRANSABDOMINAL ONLY      Will stop the tylenol. Has been taking 3-4g daily recently  She has increased mag ox to tid  Recheck labs  We discusse that some endometrial abnormalities can be due to endometrial cancer. She denies any vaginal bleeding she is willing to do a transabdominal pelvic u/s but does not want to  pursue intravaginal study. Son also present for discussion.       By signing my name below, I, Ally Sreekanth, attest that this documentation has been prepared under the direction and in the presence of Bandar Acharya MD      7/21/2019 4:07 PM    I, Bandar Acharya, personally performed the services described in this documentation, as documented by the scribe in my presence, and it is both accurate and complete.  7/21/2019 4:07 PM

## 2019-07-19 LAB
GLIADIN PEPTIDE IGA SER IA-ACNC: 1 ELIA U/ML
GLIADIN PEPTIDE IGG SER IA-ACNC: <0.4 ELIA U/ML
TTG IGA SER IA-ACNC: 0.4 ELIA U/ML

## 2019-08-08 ENCOUNTER — TELEPHONE (OUTPATIENT)
Dept: FAMILY MEDICINE | Facility: CLINIC | Age: 84
End: 2019-08-08

## 2019-08-08 NOTE — TELEPHONE ENCOUNTER
Pt's son Jet called [phone] to see if anything is needed prior to pt's procedure on 08/13. I informed him of earlier call from [facility] and he will contact them at [phone]  To confirm.

## 2019-08-08 NOTE — TELEPHONE ENCOUNTER
Faxed 2017 EKG to [facility] at [phone]  Phone [phone] ext [phone].    They will mohsen back if pre-op appt is needed.

## 2019-08-09 NOTE — TELEPHONE ENCOUNTER
Jet returned call to advised [facility] head confirmed that pt does not need any pre surgical clearance.

## 2019-08-14 ENCOUNTER — TELEPHONE (OUTPATIENT)
Dept: FAMILY MEDICINE | Facility: CLINIC | Age: 84
End: 2019-08-14

## 2019-08-14 NOTE — TELEPHONE ENCOUNTER
Gus Hilton,   Your labs for liver and sodium have returned to normal. The only abnormality is your protein level is low now. Focus on getting adequate protein in your diet. I will await your ultrasound studies.   Bandar Acharya MD    [facility]     [facility] Chart User Last Read On   Lida Foley Not Read

## 2019-08-15 NOTE — TELEPHONE ENCOUNTER
"Spoke with patient's son about 's message, he had no questions at this time and stated he is \" trying to raise her proteins but having issues with her eating her red meats.\"    He also wanted to let us know her ultrasound is scheduled for September and she already had her back injections at [facility] in [facility] with .  "

## 2019-10-01 ENCOUNTER — OFFICE VISIT (OUTPATIENT)
Dept: FAMILY MEDICINE | Facility: CLINIC | Age: 84
End: 2019-10-01
Payer: MEDICARE

## 2019-10-01 VITALS
BODY MASS INDEX: 18.49 KG/M2 | HEART RATE: 94 BPM | RESPIRATION RATE: 16 BRPM | DIASTOLIC BLOOD PRESSURE: 62 MMHG | TEMPERATURE: 97.7 F | WEIGHT: 94.2 LBS | SYSTOLIC BLOOD PRESSURE: 96 MMHG | HEIGHT: 60 IN

## 2019-10-01 DIAGNOSIS — N18.2 CHRONIC KIDNEY DISEASE, STAGE II (MILD): ICD-10-CM

## 2019-10-01 DIAGNOSIS — E83.42 HYPOMAGNESEMIA: ICD-10-CM

## 2019-10-01 DIAGNOSIS — Z23 NEED FOR IMMUNIZATION AGAINST INFLUENZA: ICD-10-CM

## 2019-10-01 DIAGNOSIS — F41.9 ANXIETY AND DEPRESSION: ICD-10-CM

## 2019-10-01 DIAGNOSIS — G25.81 RESTLESS LEG SYNDROME: Primary | ICD-10-CM

## 2019-10-01 DIAGNOSIS — R93.89 ENDOMETRIAL THICKENING ON ULTRASOUND: ICD-10-CM

## 2019-10-01 DIAGNOSIS — M81.0 AGE-RELATED OSTEOPOROSIS WITHOUT CURRENT PATHOLOGICAL FRACTURE: ICD-10-CM

## 2019-10-01 DIAGNOSIS — E06.3 HYPOTHYROIDISM DUE TO HASHIMOTO'S THYROIDITIS: ICD-10-CM

## 2019-10-01 DIAGNOSIS — E55.9 VITAMIN D DEFICIENCY: ICD-10-CM

## 2019-10-01 DIAGNOSIS — K21.9 GERD WITHOUT ESOPHAGITIS: ICD-10-CM

## 2019-10-01 DIAGNOSIS — F32.A ANXIETY AND DEPRESSION: ICD-10-CM

## 2019-10-01 LAB
25(OH)D3 SERPL-MCNC: 48 NG/ML (ref 30–100)
ALBUMIN SERPL-MCNC: 4.1 G/DL (ref 3.4–5)
ALP SERPL-CCNC: 59 IU/L (ref 35–126)
ALT SERPL-CCNC: 20 IU/L (ref 11–54)
ANION GAP SERPL CALC-SCNC: 11 MEQ/L (ref 3–15)
AST SERPL-CCNC: 28 IU/L (ref 15–41)
BASOPHILS # BLD: 0.03 K/UL (ref 0.01–0.1)
BASOPHILS NFR BLD: 0.5 %
BILIRUB SERPL-MCNC: 0.5 MG/DL (ref 0.3–1.2)
BUN SERPL-MCNC: 13 MG/DL (ref 8–20)
CALCIUM SERPL-MCNC: 9.6 MG/DL (ref 8.9–10.3)
CHLORIDE SERPL-SCNC: 99 MEQ/L (ref 98–109)
CO2 SERPL-SCNC: 25 MEQ/L (ref 22–32)
CREAT SERPL-MCNC: 0.7 MG/DL
DIFFERENTIAL METHOD BLD: NORMAL
EOSINOPHIL # BLD: 0.18 K/UL (ref 0.04–0.36)
EOSINOPHIL NFR BLD: 3.1 %
ERYTHROCYTE [DISTWIDTH] IN BLOOD BY AUTOMATED COUNT: 13.2 % (ref 11.7–14.4)
GFR SERPL CREATININE-BSD FRML MDRD: >60 ML/MIN/1.73M*2
GLUCOSE SERPL-MCNC: 91 MG/DL (ref 70–99)
HCT VFR BLDCO AUTO: 39.5 %
HGB BLD-MCNC: 12.8 G/DL
IMM GRANULOCYTES # BLD AUTO: 0.01 K/UL (ref 0–0.08)
IMM GRANULOCYTES NFR BLD AUTO: 0.2 %
LYMPHOCYTES # BLD: 2.26 K/UL (ref 1.2–3.5)
LYMPHOCYTES NFR BLD: 38.6 %
MAGNESIUM SERPL-MCNC: 1.7 MG/DL (ref 1.8–2.5)
MCH RBC QN AUTO: 31.4 PG (ref 28–33.2)
MCHC RBC AUTO-ENTMCNC: 32.4 G/DL (ref 32.2–35.5)
MCV RBC AUTO: 96.8 FL (ref 83–98)
MONOCYTES # BLD: 0.5 K/UL (ref 0.28–0.8)
MONOCYTES NFR BLD: 8.5 %
NEUTROPHILS # BLD: 2.88 K/UL (ref 1.7–7)
NEUTS SEG NFR BLD: 49.1 %
NRBC BLD-RTO: 0 %
PDW BLD AUTO: 9.9 FL (ref 9.4–12.3)
PHOSPHATE SERPL-MCNC: 4.2 MG/DL (ref 2.4–4.7)
PLATELET # BLD AUTO: 282 K/UL
POTASSIUM SERPL-SCNC: 4.1 MEQ/L (ref 3.6–5.1)
PROT SERPL-MCNC: 6.4 G/DL (ref 6–8.2)
RBC # BLD AUTO: 4.08 M/UL (ref 3.93–5.22)
SODIUM SERPL-SCNC: 135 MEQ/L (ref 136–144)
T4 FREE SERPL-MCNC: 1.01 NG/DL (ref 0.58–1.64)
TSH SERPL DL<=0.05 MIU/L-ACNC: 4.35 MIU/L (ref 0.34–5.6)
URATE SERPL-MCNC: 2.5 MG/DL (ref 2.6–8)
WBC # BLD AUTO: 5.86 K/UL

## 2019-10-01 PROCEDURE — 82306 VITAMIN D 25 HYDROXY: CPT | Performed by: FAMILY MEDICINE

## 2019-10-01 PROCEDURE — 80053 COMPREHEN METABOLIC PANEL: CPT | Performed by: FAMILY MEDICINE

## 2019-10-01 PROCEDURE — 84443 ASSAY THYROID STIM HORMONE: CPT | Performed by: FAMILY MEDICINE

## 2019-10-01 PROCEDURE — 90653 IIV ADJUVANT VACCINE IM: CPT | Performed by: FAMILY MEDICINE

## 2019-10-01 PROCEDURE — 84550 ASSAY OF BLOOD/URIC ACID: CPT | Performed by: FAMILY MEDICINE

## 2019-10-01 PROCEDURE — 84100 ASSAY OF PHOSPHORUS: CPT | Performed by: FAMILY MEDICINE

## 2019-10-01 PROCEDURE — G0008 ADMIN INFLUENZA VIRUS VAC: HCPCS | Performed by: FAMILY MEDICINE

## 2019-10-01 PROCEDURE — 85025 COMPLETE CBC W/AUTO DIFF WBC: CPT | Performed by: FAMILY MEDICINE

## 2019-10-01 PROCEDURE — 99214 OFFICE O/P EST MOD 30 MIN: CPT | Mod: 25 | Performed by: FAMILY MEDICINE

## 2019-10-01 PROCEDURE — 84439 ASSAY OF FREE THYROXINE: CPT | Performed by: FAMILY MEDICINE

## 2019-10-01 PROCEDURE — 36415 COLL VENOUS BLD VENIPUNCTURE: CPT | Performed by: FAMILY MEDICINE

## 2019-10-01 PROCEDURE — 83735 ASSAY OF MAGNESIUM: CPT | Performed by: FAMILY MEDICINE

## 2019-10-01 RX ORDER — PRAMIPEXOLE DIHYDROCHLORIDE 0.12 MG/1
0.12 TABLET ORAL NIGHTLY
Qty: 30 TABLET | Refills: 1 | Status: SHIPPED | OUTPATIENT
Start: 2019-10-01 | End: 2019-12-04 | Stop reason: SDUPTHER

## 2019-10-01 RX ORDER — CITALOPRAM 20 MG/1
20 TABLET, FILM COATED ORAL
Qty: 90 TABLET | Refills: 1 | Status: SHIPPED | OUTPATIENT
Start: 2019-10-01 | End: 2020-03-16

## 2019-10-01 ASSESSMENT — ENCOUNTER SYMPTOMS
UNEXPECTED WEIGHT CHANGE: 1
FATIGUE: 1
APPETITE CHANGE: 1

## 2019-10-01 NOTE — PATIENT INSTRUCTIONS
VACCINE INFORMATION STATEMENT     Influenza (Flu) Vaccine (Inactivated or Recombinant): What you need to know     Many Vaccine Information Statements are available in [place] and other languages. See  www.immunize.org/vis    Hojas de información sobre vacunas están disponibles en español y en muchos otrosidiomas. Visite www.immunize.org/vis     1. Why Get Vaccinated?  Influenza vaccine can prevent influenza (flu).  Flu is a contagious disease that spreads around the [place] every year, usually between October and May. Anyone can get the flu, but it is more dangerous for some people. Infants and young children, people 65 years of age and older, pregnant women, and people with certain health conditions or a weakened immune system are at greatest risk of flu complications.  Pneumonia, bronchitis, sinus infections and ear infections are examples of flu-related complications. If you have a medical condition, such as heart disease, cancer or diabetes, flu can make it worse.  Flu can cause fever and chills, sore throat, muscle aches, fatigue, cough, headache, and runny or stuffy nose. Some people may have vomiting and diarrhea, though this is more common in children than adults.  Each year thousands of people in the [place] die from flu, and many more are hospitalized. Flu vaccine prevents millions of illnesses and flu-related visits to the doctor each year.    2. Influenza vaccine  CDC recommends everyone 6 months of age and older get vaccinated every flu season. Children  6 months through 8 years of age may need 2 doses during a single flu season. Everyone else needs only 1 dose each flu season.  It takes about 2 weeks for protection to develop after vaccination.  There are many flu viruses, and they are always changing. Each year a new flu vaccine is made to protect against three or four viruses that are likely to cause disease in the upcoming flu season. Even when the vaccine doesn't exactly match  these viruses, it may still provide some protection.  Influenza vaccine does not cause flu.  Influenza vaccine may be given at the same time as other vaccines.    3. Talk with your health care provider   Tell your vaccine provider if the person getting the vaccine:  Has had an allergic reaction after a previous dose of influenza vaccine, or has any severe, life- threatening allergies.  Has ever had Guillain-Barré Syndrome (also called GBS).  In some cases, your health care provider may decide to postpone influenza vaccination to a future visit.  People with minor illnesses, such as a cold, may be vaccinated. People who are moderately or severely ill should usually wait until they recover before getting influenza vaccine.  Your health care provider can give you more information.    4. Risks of vaccine reaction  Soreness, redness, and swelling where shot is given, fever, muscle aches, and headache can happen after influenza vaccine.  There may be a very small increased risk of Guillain-Barré Syndrome (GBS) after inactivated influenza vaccine (the flu shot).      Young children who get the flu shot along with pneumococcal vaccine (PCV13), and/or DTaP vaccine at the same time might be slightly more likely to have a seizure caused by fever. Tell your health care provider if a child who is getting flu vaccine has ever had a seizure.  People sometimes faint after medical procedures, including vaccination. Tell your provider if you feel dizzy or have vision changes or ringing in the ears.  As with any medicine, there is a very remote chance of a vaccine causing a severe allergic reaction, other serious injury, or death.     5. What if there is a serious problem?  An allergic reaction could occur after the vaccinated person leaves the clinic. If you see signs of a  severe allergic reaction (hives, swelling of the face and throat, difficulty breathing, a fast heartbeat, dizziness, or weakness), call 9-1-1 and get the person  to the nearest hospital.  For other signs that concern you, call your health care provider.  Adverse reactions should be reported to the Vaccine Adverse Event Reporting System (VAERS). Your health care provider will usually file this report, or you can do it yourself. Visit the VAERS website at www.vaers.[facility].gov or call 1-832.286.1828. VAERS is only for reporting reactions, and VAERS staff do not give medical advice.    6. The National Vaccine Injury Compensation Program  The National Vaccine Injury Compensation Program (VICP) is a federal program that was created to compensate people who may have been injured by certain vaccines. Visit the VICP website at www.[facility].gov/vaccinecompensation or call 1-838.223.2247 to learn about the program and about filing a claim. There is a time limit to file a claim for compensation.    7. How can I learn more?  Ask your healthcare provider.  Call your local or state health department.  Contact the Centers for Disease Control and Prevention (CDC):  - Call 1-382.932.8052 (9-777-DRV-INFO) or  - Visit CDC's www.cdc.gov/flu       U.S. Department of  Health and Human Services  Centers for Disease Control and Prevention  Vaccine Information Statement (Interim)  Inactivated Influenza Vaccine  8/15/2019  42 U.S.C. § 300aa-26

## 2019-10-01 NOTE — PROGRESS NOTES
Patient ID: Lida Foley is a 84 y.o. female.        Subjective     HPI  Patient presents for fatigue. She is here with her son today.  Abnormal Pelvic US - F/u with Pelvic US to rule out any abnormalities without the care of the GYN who refused per pt/son. Previous US showed endometrial thickening of the uterus.   Restless Leg Syndrome - Would like treatment for increased RLS symptoms.  Labs from July iron stores normal.   Diet - She eats a lot of carbohydrates. History of anorexia and bulimia.   GERD - Complains of acid reflux. Has a hiatal hernia. Can be explained by her diet.  Hypomagnesemia - Low magnesium as a result of a medication.  Labs ordered by endo and nephrology requested    The following have been reviewed and updated as appropriate in this visit:  Allergies  Meds  Problems       Patient Active Problem List   Diagnosis   • Anxiety and depression   • Chronic kidney disease, stage II (mild)   • Degenerative disc disease, lumbar   • Gout   • Hypothyroidism due to Hashimoto's thyroiditis   • Mild intermittent asthma without complication   • GERD without esophagitis   • Osteoarthritis involving multiple joints on both sides of body   • Chronic sinusitis   • MRSA (methicillin resistant Staphylococcus aureus)   • PAC (premature atrial contraction)   • Hyperlipidemia   • Gastroparesis   • DVT (deep venous thrombosis) (CMS/HCC)   • Age-related osteoporosis without current pathological fracture   • Weight loss, unintentional   • Pruritus   • Chronic vasomotor rhinitis   • Stress disorder, post traumatic   • Nausea   • Hyponatremia   • Hypomagnesemia   • Elevated liver enzymes   • Vitamin D deficiency       Outpatient Encounter Prescriptions as of 10/1/2019:   •  allopurinol (ZYLOPRIM) 300 mg tablet, Take 1 tablet (300 mg total) by mouth once daily.  •  citalopram (celeXA) 20 mg tablet, Take 1 tablet (20 mg total) by mouth once daily.  •  conjugated estrogens (PREMARIN) 0.625 mg/gram vaginal cream, apply  small pea sized amount to external vaginal/urethral area daily for 2 weeks then 2x/week  •  diclofenac sodium (VOLTAREN) 1 % topical gel, apply 4 grams to affected area four times a day  •  estradiol (ESTRACE) 0.01 % (0.1 mg/gram) vaginal cream, insert (1G)  by vaginal route 2x/week  •  fexofenadine (ALLEGRA) 180 mg tablet, Take 180 mg by mouth daily.  •  fluticasone (FLONASE) 50 mcg/actuation nasal spray, Administer 1 spray into each nostril 2 (two) times a day.  •  levothyroxine (SYNTHROID) 75 mcg tablet, Take 1 tablet (75 mcg total) by mouth daily.  •  magnesium oxide 400 mg capsule, 400 mg.  •  mupirocin (BACTROBAN) 2 % ointment, 2 %.  •  pantoprazole (PROTONIX) 40 mg EC tablet, Take 1 tablet (40 mg total) by mouth daily.  •  prochlorperazine (COMPAZINE) 5 mg tablet, take 1 tablet by mouth every 8 hours if needed for nausea and vomiting  •  zoledronic acid (RECLAST) IVPB, Infuse 5 mg into a venous catheter once.  •  [DISCONTINUED] citalopram (celeXA) 20 mg tablet, take 1 tablet by mouth once daily  •  albuterol HFA (PROAIR HFA) 90 mcg/actuation inhaler, Inhale 2 puffs every 4 (four) hours as needed for wheezing.  •  [] clotrimazole (LOTRIMIN) 1 % topical cream, Apply topically 2 (two) times a day.  •  [] ipratropium (ATROVENT) 42 mcg (0.06 %) nasal spray, Administer 2 sprays into each nostril 3 (three) times a day.  •  [] montelukast (SINGULAIR) 10 mg tablet, Take 1 tablet (10 mg total) by mouth nightly.  •  pramipexole (MIRAPEX) 0.125 mg tablet, Take 1 tablet (0.125 mg total) by mouth nightly.    Allergies   Allergen Reactions   • Iodine And Iodide Containing Products      Other reaction(s): Syncope   Iodine containing   • Ciprofloxacin      Other reaction(s): Vomiting   • Codeine      Other reaction(s): abdominal pain   • Iodinated Contrast Media      Other reaction(s): Anaphylaxis   • Latex      Other reaction(s): Hives   • Moxifloxacin Nausea Only     Other reaction(s): Vomiting   •  Moxifloxacin Hcl      Other reaction(s): hives   • Nsaids (Non-Steroidal Anti-Inflammatory Drug)      Other reaction(s): kidney failure   • Oxycodone-Acetaminophen      Other reaction(s): GI Upset, Hallucinations    • Penicillins      Other reaction(s): Hives   • Shellfish Containing Products      Other reaction(s): GI Upset   • Sulfa (Sulfonamide Antibiotics)      Other reaction(s): Hives     Review of Systems   Constitutional: Positive for appetite change, fatigue and unexpected weight change (Weight loss).   Cardiovascular: Negative for leg swelling.         Objective     Vitals:    10/01/19 0956   BP: 96/62   BP Location: Left upper arm   Patient Position: Sitting   Pulse: 94   Resp: 16   Temp: 36.5 °C (97.7 °F)   TempSrc: Oral   Weight: 42.7 kg (94 lb 3.2 oz)   Height: 1.524 m (5')     Physical Exam   Constitutional: She appears well-developed and well-nourished.   HENT:   Head: Normocephalic.   Right Ear: Tympanic membrane normal.   Left Ear: Tympanic membrane normal.   Mouth/Throat: Oropharynx is clear and moist.   Eyes: Pupils are equal, round, and reactive to light.   Neck: Neck supple. No thyromegaly present.   Cardiovascular: Normal rate, regular rhythm, normal heart sounds and intact distal pulses.    Pulmonary/Chest: Effort normal and breath sounds normal.   Abdominal: Soft. She exhibits no distension. There is no hepatosplenomegaly. There is no tenderness.   Musculoskeletal: She exhibits no edema.   Lymphadenopathy:     She has no cervical adenopathy.   Neurological: She is alert. She exhibits normal muscle tone.   Skin: Skin is warm and dry.   Nursing note and vitals reviewed.         Assessment/Plan       Problem List Items Addressed This Visit     Anxiety and depression    Relevant Medications    citalopram (celeXA) 20 mg tablet    Chronic kidney disease, stage II (mild)    Relevant Orders    CBC and Differential    Comprehensive metabolic panel    Microalbumin/Creatinine Ur Random    Magnesium     Phosphorus    Protein / creatinine ratio, urine    Uric acid    Urinalysis with Reflex Culture    CBC    Diff Count    Hypothyroidism due to Hashimoto's thyroiditis    Relevant Orders    TSH 3rd Generation    T4, free    GERD without esophagitis    Age-related osteoporosis without current pathological fracture    Hypomagnesemia    Vitamin D deficiency    Relevant Orders    Vitamin D 25 hydroxy      Other Visit Diagnoses     Restless leg syndrome    -  Primary    Relevant Medications    pramipexole (MIRAPEX) 0.125 mg tablet    Need for immunization against influenza        Relevant Orders    Influenza vaccine 65 and older IM preservative free    Endometrial thickening on ultrasound            Prescribed Mirapex for RLS.  Ordered Urine testing to be collected at home and returned to office.  Ordered lab work as requested by her specialists.   Increase variety of food to prevent more weight loss.   MA to check eligibility for PNA vaccine.   She will sched her pelvic u/s to recheck on prior endometrial thickening.  F/u in 1 month.     By signing my name below, I, Tina Gaspar, attest that this documentation has been prepared under the direction and in the presence of Bandar Acharya MD      10/1/2019 11:03 AM      I, Bandar Acharya, personally performed the services described in this documentation, as documented by the scribe in my presence, and it is both accurate and complete.  10/1/2019 11:03 AM

## 2019-10-02 ENCOUNTER — TELEPHONE (OUTPATIENT)
Dept: FAMILY MEDICINE | Facility: CLINIC | Age: 84
End: 2019-10-02

## 2019-10-02 NOTE — TELEPHONE ENCOUNTER
pls send copy of labs to endocrinologist and to her nephrologist per orders we have. Her magnesium is slightly low. Double check on how she is taking her magnesium oxide. It looks like her nephrologist may order this medication.

## 2019-10-07 ENCOUNTER — TRANSCRIBE ORDERS (OUTPATIENT)
Dept: SCHEDULING | Age: 84
End: 2019-10-07

## 2019-10-07 DIAGNOSIS — N18.2 CHRONIC KIDNEY DISEASE, STAGE 2 (MILD): Primary | ICD-10-CM

## 2019-10-07 PROCEDURE — 81001 URINALYSIS AUTO W/SCOPE: CPT | Performed by: FAMILY MEDICINE

## 2019-10-07 PROCEDURE — 82570 ASSAY OF URINE CREATININE: CPT | Mod: 91 | Performed by: FAMILY MEDICINE

## 2019-10-07 PROCEDURE — 84156 ASSAY OF PROTEIN URINE: CPT | Performed by: FAMILY MEDICINE

## 2019-10-08 LAB
ALBUMIN/CREAT UR: 842.5 UG/MG
BACTERIA #/AREA URNS HPF: ABNORMAL /HPF
BILIRUB UR QL STRIP.AUTO: NEGATIVE MG/DL
CLARITY UR REFRACT.AUTO: CLEAR
COLOR UR AUTO: YELLOW
CREAT UR-MCNC: 30.8 MG/DL
CREAT UR-MCNC: 30.8 MG/DL
GLUCOSE UR STRIP.AUTO-MCNC: NEGATIVE MG/DL
HGB UR QL STRIP.AUTO: NEGATIVE
HYALINE CASTS #/AREA URNS LPF: ABNORMAL /LPF
KETONES UR STRIP.AUTO-MCNC: NEGATIVE MG/DL
LEUKOCYTE ESTERASE UR QL STRIP.AUTO: NEGATIVE
MICROALBUMIN UR-MCNC: 259.5 MG/L
NITRITE UR QL STRIP.AUTO: NEGATIVE
PH UR STRIP.AUTO: 6.5 [PH]
PROT UR QL STRIP.AUTO: 1
PROT UR-MCNC: 32 MG/DL
PROT/CREAT UR: 1.04 MG/G CREAT
RBC #/AREA URNS HPF: ABNORMAL /HPF
SP GR UR REFRACT.AUTO: 1.01
SQUAMOUS URNS QL MICRO: 1 /HPF
UROBILINOGEN UR STRIP-ACNC: 0.2 EU/DL
WBC #/AREA URNS HPF: ABNORMAL /HPF

## 2019-10-10 ENCOUNTER — TELEPHONE (OUTPATIENT)
Dept: FAMILY MEDICINE | Facility: CLINIC | Age: 84
End: 2019-10-10

## 2019-10-10 PROBLEM — N28.9 NEPHROPATHY: Status: ACTIVE | Noted: 2019-10-10

## 2019-11-06 RX ORDER — LEVOTHYROXINE SODIUM 75 UG/1
75 TABLET ORAL
Qty: 30 TABLET | Refills: 3 | Status: SHIPPED | OUTPATIENT
Start: 2019-11-06 | End: 2020-03-02

## 2019-11-06 NOTE — TELEPHONE ENCOUNTER
Medicine Refill Request    Last Office Visit: 10/1/2019  Next Office Visit: Visit date not found        Current Outpatient Medications:   •  albuterol HFA (PROAIR HFA) 90 mcg/actuation inhaler, Inhale 2 puffs every 4 (four) hours as needed for wheezing., Disp: 1 Inhaler, Rfl: 1  •  allopurinol (ZYLOPRIM) 300 mg tablet, Take 1 tablet (300 mg total) by mouth once daily., Disp: 90 tablet, Rfl: 1  •  citalopram (celeXA) 20 mg tablet, Take 1 tablet (20 mg total) by mouth once daily., Disp: 90 tablet, Rfl: 1  •  conjugated estrogens (PREMARIN) 0.625 mg/gram vaginal cream, apply small pea sized amount to external vaginal/urethral area daily for 2 weeks then 2x/week, Disp: , Rfl:   •  diclofenac sodium (VOLTAREN) 1 % topical gel, apply 4 grams to affected area four times a day, Disp: , Rfl: 0  •  estradiol (ESTRACE) 0.01 % (0.1 mg/gram) vaginal cream, insert (1G)  by vaginal route 2x/week, Disp: , Rfl:   •  fexofenadine (ALLEGRA) 180 mg tablet, Take 180 mg by mouth daily., Disp: , Rfl:   •  fluticasone (FLONASE) 50 mcg/actuation nasal spray, Administer 1 spray into each nostril 2 (two) times a day., Disp: 1 Bottle, Rfl: 11  •  levothyroxine (SYNTHROID) 75 mcg tablet, Take 1 tablet (75 mcg total) by mouth daily., Disp: 30 tablet, Rfl: 3  •  magnesium oxide 400 mg capsule, 400 mg., Disp: , Rfl:   •  mupirocin (BACTROBAN) 2 % ointment, 2 %., Disp: , Rfl:   •  pantoprazole (PROTONIX) 40 mg EC tablet, Take 1 tablet (40 mg total) by mouth daily., Disp: 30 tablet, Rfl: 3  •  pramipexole (MIRAPEX) 0.125 mg tablet, Take 1 tablet (0.125 mg total) by mouth nightly., Disp: 30 tablet, Rfl: 1  •  prochlorperazine (COMPAZINE) 5 mg tablet, take 1 tablet by mouth every 8 hours if needed for nausea and vomiting, Disp: 30 tablet, Rfl: 1  •  zoledronic acid (RECLAST) IVPB, Infuse 5 mg into a venous catheter once., Disp: , Rfl:       BP Readings from Last 3 Encounters:   10/01/19 96/62   07/16/19 120/60   07/05/19 100/64       Recent Lab  Referral placed for patient   results:  No results found for: CHOL, No results found for: HDL, No results found for: LDLCALC, No results found for: TRIG     Lab Results   Component Value Date    GLUCOSE 91 10/01/2019   , No results found for: HGBA1C      Lab Results   Component Value Date    CREATININE 0.7 10/01/2019       Lab Results   Component Value Date    TSH 4.35 10/01/2019

## 2019-11-08 ENCOUNTER — TELEPHONE (OUTPATIENT)
Dept: FAMILY MEDICINE | Facility: CLINIC | Age: 84
End: 2019-11-08

## 2019-11-08 NOTE — TELEPHONE ENCOUNTER
Medicine Refill Request    Last Office Visit: 10/1/2019  Next Office Visit: Visit date not found    Refill needed on Levothyroxine 75 mg to go to [facility].  Patient only has 1 pill left.    Current Outpatient Medications:   •  albuterol HFA (PROAIR HFA) 90 mcg/actuation inhaler, Inhale 2 puffs every 4 (four) hours as needed for wheezing., Disp: 1 Inhaler, Rfl: 1  •  allopurinol (ZYLOPRIM) 300 mg tablet, Take 1 tablet (300 mg total) by mouth once daily., Disp: 90 tablet, Rfl: 1  •  citalopram (celeXA) 20 mg tablet, Take 1 tablet (20 mg total) by mouth once daily., Disp: 90 tablet, Rfl: 1  •  conjugated estrogens (PREMARIN) 0.625 mg/gram vaginal cream, apply small pea sized amount to external vaginal/urethral area daily for 2 weeks then 2x/week, Disp: , Rfl:   •  diclofenac sodium (VOLTAREN) 1 % topical gel, apply 4 grams to affected area four times a day, Disp: , Rfl: 0  •  estradiol (ESTRACE) 0.01 % (0.1 mg/gram) vaginal cream, insert (1G)  by vaginal route 2x/week, Disp: , Rfl:   •  fexofenadine (ALLEGRA) 180 mg tablet, Take 180 mg by mouth daily., Disp: , Rfl:   •  fluticasone (FLONASE) 50 mcg/actuation nasal spray, Administer 1 spray into each nostril 2 (two) times a day., Disp: 1 Bottle, Rfl: 11  •  levothyroxine (SYNTHROID) 75 mcg tablet, TAKE 1 TABLET (75 MCG TOTAL) BY MOUTH DAILY., Disp: 30 tablet, Rfl: 3  •  magnesium oxide 400 mg capsule, 400 mg., Disp: , Rfl:   •  mupirocin (BACTROBAN) 2 % ointment, 2 %., Disp: , Rfl:   •  pantoprazole (PROTONIX) 40 mg EC tablet, Take 1 tablet (40 mg total) by mouth daily., Disp: 30 tablet, Rfl: 3  •  pramipexole (MIRAPEX) 0.125 mg tablet, Take 1 tablet (0.125 mg total) by mouth nightly., Disp: 30 tablet, Rfl: 1  •  prochlorperazine (COMPAZINE) 5 mg tablet, take 1 tablet by mouth every 8 hours if needed for nausea and vomiting, Disp: 30 tablet, Rfl: 1  •  zoledronic acid (RECLAST) IVPB, Infuse 5 mg into a venous catheter once., Disp: , Rfl:       BP  Readings from Last 3 Encounters:   10/01/19 96/62   07/16/19 120/60   07/05/19 100/64       Recent Lab results:  No results found for: CHOL, No results found for: HDL, No results found for: LDLCALC, No results found for: TRIG     Lab Results   Component Value Date    GLUCOSE 91 10/01/2019   , No results found for: HGBA1C      Lab Results   Component Value Date    CREATININE 0.7 10/01/2019       Lab Results   Component Value Date    TSH 4.35 10/01/2019

## 2019-12-04 ENCOUNTER — TELEPHONE (OUTPATIENT)
Dept: FAMILY MEDICINE | Facility: CLINIC | Age: 84
End: 2019-12-04

## 2019-12-04 DIAGNOSIS — G25.81 RESTLESS LEG SYNDROME: ICD-10-CM

## 2019-12-04 NOTE — TELEPHONE ENCOUNTER
Medicine Refill Request    Last Office Visit: 10/1/2019  Next Office Visit: 12/12/2019        Current Outpatient Medications:   •  albuterol HFA (PROAIR HFA) 90 mcg/actuation inhaler, Inhale 2 puffs every 4 (four) hours as needed for wheezing., Disp: 1 Inhaler, Rfl: 1  •  allopurinol (ZYLOPRIM) 300 mg tablet, Take 1 tablet (300 mg total) by mouth once daily., Disp: 90 tablet, Rfl: 1  •  citalopram (celeXA) 20 mg tablet, Take 1 tablet (20 mg total) by mouth once daily., Disp: 90 tablet, Rfl: 1  •  conjugated estrogens (PREMARIN) 0.625 mg/gram vaginal cream, apply small pea sized amount to external vaginal/urethral area daily for 2 weeks then 2x/week, Disp: , Rfl:   •  diclofenac sodium (VOLTAREN) 1 % topical gel, apply 4 grams to affected area four times a day, Disp: , Rfl: 0  •  estradiol (ESTRACE) 0.01 % (0.1 mg/gram) vaginal cream, insert (1G)  by vaginal route 2x/week, Disp: , Rfl:   •  fexofenadine (ALLEGRA) 180 mg tablet, Take 180 mg by mouth daily., Disp: , Rfl:   •  fluticasone (FLONASE) 50 mcg/actuation nasal spray, Administer 1 spray into each nostril 2 (two) times a day., Disp: 1 Bottle, Rfl: 11  •  levothyroxine (SYNTHROID) 75 mcg tablet, TAKE 1 TABLET (75 MCG TOTAL) BY MOUTH DAILY., Disp: 30 tablet, Rfl: 3  •  magnesium oxide 400 mg capsule, 400 mg., Disp: , Rfl:   •  mupirocin (BACTROBAN) 2 % ointment, 2 %., Disp: , Rfl:   •  pantoprazole (PROTONIX) 40 mg EC tablet, Take 1 tablet (40 mg total) by mouth daily., Disp: 30 tablet, Rfl: 3  •  pramipexole (MIRAPEX) 0.125 mg tablet, Take 1 tablet (0.125 mg total) by mouth nightly., Disp: 30 tablet, Rfl: 1  •  prochlorperazine (COMPAZINE) 5 mg tablet, take 1 tablet by mouth every 8 hours if needed for nausea and vomiting, Disp: 30 tablet, Rfl: 1  •  zoledronic acid (RECLAST) IVPB, Infuse 5 mg into a venous catheter once., Disp: , Rfl:       BP Readings from Last 3 Encounters:   10/01/19 96/62   07/16/19 120/60   07/05/19 100/64       Recent Lab results:  No  results found for: CHOL, No results found for: HDL, No results found for: LDLCALC, No results found for: TRIG     Lab Results   Component Value Date    GLUCOSE 91 10/01/2019   , No results found for: HGBA1C      Lab Results   Component Value Date    CREATININE 0.7 10/01/2019       Lab Results   Component Value Date    TSH 4.35 10/01/2019

## 2019-12-04 NOTE — TELEPHONE ENCOUNTER
Medicine Refill Request    Last Office Visit: 10/1/2019  Next Office Visit: 12/12/2019    Please call in Pramipoexole  0.125mg [facility] [address]    Current Outpatient Medications:   •  albuterol HFA (PROAIR HFA) 90 mcg/actuation inhaler, Inhale 2 puffs every 4 (four) hours as needed for wheezing., Disp: 1 Inhaler, Rfl: 1  •  allopurinol (ZYLOPRIM) 300 mg tablet, Take 1 tablet (300 mg total) by mouth once daily., Disp: 90 tablet, Rfl: 1  •  citalopram (celeXA) 20 mg tablet, Take 1 tablet (20 mg total) by mouth once daily., Disp: 90 tablet, Rfl: 1  •  conjugated estrogens (PREMARIN) 0.625 mg/gram vaginal cream, apply small pea sized amount to external vaginal/urethral area daily for 2 weeks then 2x/week, Disp: , Rfl:   •  diclofenac sodium (VOLTAREN) 1 % topical gel, apply 4 grams to affected area four times a day, Disp: , Rfl: 0  •  estradiol (ESTRACE) 0.01 % (0.1 mg/gram) vaginal cream, insert (1G)  by vaginal route 2x/week, Disp: , Rfl:   •  fexofenadine (ALLEGRA) 180 mg tablet, Take 180 mg by mouth daily., Disp: , Rfl:   •  fluticasone (FLONASE) 50 mcg/actuation nasal spray, Administer 1 spray into each nostril 2 (two) times a day., Disp: 1 Bottle, Rfl: 11  •  levothyroxine (SYNTHROID) 75 mcg tablet, TAKE 1 TABLET (75 MCG TOTAL) BY MOUTH DAILY., Disp: 30 tablet, Rfl: 3  •  magnesium oxide 400 mg capsule, 400 mg., Disp: , Rfl:   •  mupirocin (BACTROBAN) 2 % ointment, 2 %., Disp: , Rfl:   •  pantoprazole (PROTONIX) 40 mg EC tablet, Take 1 tablet (40 mg total) by mouth daily., Disp: 30 tablet, Rfl: 3  •  pramipexole (MIRAPEX) 0.125 mg tablet, Take 1 tablet (0.125 mg total) by mouth nightly., Disp: 30 tablet, Rfl: 1  •  prochlorperazine (COMPAZINE) 5 mg tablet, take 1 tablet by mouth every 8 hours if needed for nausea and vomiting, Disp: 30 tablet, Rfl: 1  •  zoledronic acid (RECLAST) IVPB, Infuse 5 mg into a venous catheter once., Disp: , Rfl:       BP Readings from Last 3 Encounters:   10/01/19 96/62   07/16/19  120/60   07/05/19 100/64       Recent Lab results:  No results found for: CHOL, No results found for: HDL, No results found for: LDLCALC, No results found for: TRIG     Lab Results   Component Value Date    GLUCOSE 91 10/01/2019   , No results found for: HGBA1C      Lab Results   Component Value Date    CREATININE 0.7 10/01/2019       Lab Results   Component Value Date    TSH 4.35 10/01/2019

## 2019-12-05 RX ORDER — PRAMIPEXOLE DIHYDROCHLORIDE 0.12 MG/1
TABLET ORAL
Qty: 30 TABLET | Refills: 1 | Status: SHIPPED | OUTPATIENT
Start: 2019-12-05 | End: 2020-04-06

## 2019-12-12 ENCOUNTER — OFFICE VISIT (OUTPATIENT)
Dept: FAMILY MEDICINE | Facility: CLINIC | Age: 84
End: 2019-12-12
Payer: MEDICARE

## 2019-12-12 VITALS
WEIGHT: 91 LBS | SYSTOLIC BLOOD PRESSURE: 102 MMHG | RESPIRATION RATE: 16 BRPM | TEMPERATURE: 97.9 F | HEART RATE: 78 BPM | BODY MASS INDEX: 17.87 KG/M2 | HEIGHT: 60 IN | DIASTOLIC BLOOD PRESSURE: 60 MMHG

## 2019-12-12 DIAGNOSIS — M81.0 AGE-RELATED OSTEOPOROSIS WITHOUT CURRENT PATHOLOGICAL FRACTURE: ICD-10-CM

## 2019-12-12 DIAGNOSIS — E06.3 HYPOTHYROIDISM DUE TO HASHIMOTO'S THYROIDITIS: ICD-10-CM

## 2019-12-12 DIAGNOSIS — E87.1 HYPONATREMIA: ICD-10-CM

## 2019-12-12 DIAGNOSIS — F41.9 ANXIETY AND DEPRESSION: ICD-10-CM

## 2019-12-12 DIAGNOSIS — N18.2 CHRONIC KIDNEY DISEASE, STAGE II (MILD): ICD-10-CM

## 2019-12-12 DIAGNOSIS — E83.42 HYPOMAGNESEMIA: ICD-10-CM

## 2019-12-12 DIAGNOSIS — G25.81 RESTLESS LEG SYNDROME, CONTROLLED: ICD-10-CM

## 2019-12-12 DIAGNOSIS — R63.0 DECREASE IN APPETITE: ICD-10-CM

## 2019-12-12 DIAGNOSIS — R63.4 RECENT WEIGHT LOSS: Primary | ICD-10-CM

## 2019-12-12 DIAGNOSIS — F32.A ANXIETY AND DEPRESSION: ICD-10-CM

## 2019-12-12 LAB
ANION GAP SERPL CALC-SCNC: 8 MEQ/L (ref 3–15)
BUN SERPL-MCNC: 16 MG/DL (ref 8–20)
CALCIUM SERPL-MCNC: 9.8 MG/DL (ref 8.9–10.3)
CHLORIDE SERPL-SCNC: 100 MEQ/L (ref 98–109)
CHOLEST SERPL-MCNC: 208 MG/DL
CO2 SERPL-SCNC: 30 MEQ/L (ref 22–32)
CREAT SERPL-MCNC: 0.7 MG/DL
GFR SERPL CREATININE-BSD FRML MDRD: >60 ML/MIN/1.73M*2
GLUCOSE SERPL-MCNC: 90 MG/DL (ref 70–99)
HDLC SERPL-MCNC: 68 MG/DL
HDLC SERPL: 3.1 {RATIO}
LDLC SERPL CALC-MCNC: 118 MG/DL
MAGNESIUM SERPL-MCNC: 2 MG/DL (ref 1.8–2.5)
NONHDLC SERPL-MCNC: 140 MG/DL
POTASSIUM SERPL-SCNC: 4.3 MEQ/L (ref 3.6–5.1)
SODIUM SERPL-SCNC: 138 MEQ/L (ref 136–144)
TRIGL SERPL-MCNC: 110 MG/DL (ref 30–149)
TSH SERPL DL<=0.05 MIU/L-ACNC: 7.57 MIU/L (ref 0.34–5.6)

## 2019-12-12 PROCEDURE — 36415 COLL VENOUS BLD VENIPUNCTURE: CPT | Performed by: FAMILY MEDICINE

## 2019-12-12 PROCEDURE — 80061 LIPID PANEL: CPT | Performed by: FAMILY MEDICINE

## 2019-12-12 PROCEDURE — 99214 OFFICE O/P EST MOD 30 MIN: CPT | Performed by: FAMILY MEDICINE

## 2019-12-12 PROCEDURE — 83735 ASSAY OF MAGNESIUM: CPT | Performed by: FAMILY MEDICINE

## 2019-12-12 PROCEDURE — 84443 ASSAY THYROID STIM HORMONE: CPT | Performed by: FAMILY MEDICINE

## 2019-12-12 PROCEDURE — 80048 BASIC METABOLIC PNL TOTAL CA: CPT | Performed by: FAMILY MEDICINE

## 2019-12-12 RX ORDER — MIRTAZAPINE 7.5 MG/1
7.5 TABLET, FILM COATED ORAL NIGHTLY
Qty: 30 TABLET | Refills: 1 | Status: SHIPPED | OUTPATIENT
Start: 2019-12-12 | End: 2020-01-09 | Stop reason: SINTOL

## 2019-12-12 RX ORDER — ALLOPURINOL 300 MG/1
300 TABLET ORAL
Qty: 90 TABLET | Refills: 1 | Status: SHIPPED | OUTPATIENT
Start: 2019-12-12 | End: 2020-06-02

## 2019-12-12 ASSESSMENT — ENCOUNTER SYMPTOMS
DYSPHORIC MOOD: 1
CONSTIPATION: 1
NAUSEA: 1
ARTHRALGIAS: 1
COUGH: 1
APPETITE CHANGE: 1
UNEXPECTED WEIGHT CHANGE: 1

## 2019-12-12 NOTE — PROGRESS NOTES
"   Patient ID: Lida Foley is a 84 y.o. female.        Subjective     HPI  Patient presents for a f/u on multiple issues.   RLS - Last seen on 10/1 when we prescribed Mirapex for RLS. She reports that the medication is working well for her RLS. She has lost 7 lbs since 7/16/19.   Weight loss - Reports that she is having diminished appetite. Her son said it is a struggle to get her to eat. She fills up on junk food and then she will not be able to eat a nutritious meal. Her son reports that she is falling into previous unhealthy patterns with anorexia and bulimia. She does not believe she is struggling with anorexia currently. She does not have an appetite.   Constipation - Reports that she is drinking enough water. She was previously taking laxatives too often as reported by her son. Increased yogurt and no longer has constipation.  GERD - Explains that her \"mouth tastes like vinegar\" after she eats. Eggs do not irritate her stomach. Cough worse at night. Using her inhaler for cough.  Previous abnormal pelvic US - Had an appointment on 10/25 for a repeat pelvic US which she no showed for.   Arthralgias - C/o arthritis pain especially in knee and hands. Next DEXA scan due in 2/20.   Depression - Reports intermittent depression. Taking Celexa 20 mg.    The following have been reviewed and updated as appropriate in this visit:  Allergies  Meds  Problems       Patient Active Problem List   Diagnosis   • Anxiety and depression   • Chronic kidney disease, stage II (mild)   • Degenerative disc disease, lumbar   • Gout   • Hypothyroidism due to Hashimoto's thyroiditis   • Mild intermittent asthma without complication   • GERD without esophagitis   • Osteoarthritis involving multiple joints on both sides of body   • Chronic sinusitis   • MRSA (methicillin resistant Staphylococcus aureus)   • PAC (premature atrial contraction)   • Hyperlipidemia   • Gastroparesis   • DVT (deep venous thrombosis) (CMS/[facility])   • Age-related " osteoporosis without current pathological fracture   • Weight loss, unintentional   • Pruritus   • Chronic vasomotor rhinitis   • Stress disorder, post traumatic   • Nausea   • Hyponatremia   • Hypomagnesemia   • Elevated liver enzymes   • Vitamin D deficiency   • Nephropathy   • Restless leg syndrome, controlled       Outpatient Encounter Medications as of 2019:   •  allopurinol (ZYLOPRIM) 300 mg tablet, Take 1 tablet (300 mg total) by mouth once daily.  •  citalopram (celeXA) 20 mg tablet, Take 1 tablet (20 mg total) by mouth once daily.  •  conjugated estrogens (PREMARIN) 0.625 mg/gram vaginal cream, apply small pea sized amount to external vaginal/urethral area daily for 2 weeks then 2x/week  •  diclofenac sodium (VOLTAREN) 1 % topical gel, apply 4 grams to affected area four times a day  •  estradiol (ESTRACE) 0.01 % (0.1 mg/gram) vaginal cream, insert (1G)  by vaginal route 2x/week  •  fexofenadine (ALLEGRA) 180 mg tablet, Take 180 mg by mouth daily.  •  fluticasone (FLONASE) 50 mcg/actuation nasal spray, Administer 1 spray into each nostril 2 (two) times a day.  •  levothyroxine (SYNTHROID) 75 mcg tablet, TAKE 1 TABLET (75 MCG TOTAL) BY MOUTH DAILY.  •  magnesium oxide 400 mg capsule, 400 mg.  •  mupirocin (BACTROBAN) 2 % ointment, 2 %.  •  pantoprazole (PROTONIX) 40 mg EC tablet, Take 1 tablet (40 mg total) by mouth daily.  •  pramipexole (MIRAPEX) 0.125 mg tablet, take 1 tablet by mouth once daily NIGHTLY  •  prochlorperazine (COMPAZINE) 5 mg tablet, take 1 tablet by mouth every 8 hours if needed for nausea and vomiting  •  zoledronic acid (RECLAST) IVPB, Infuse 5 mg into a venous catheter once.  •  [DISCONTINUED] allopurinol (ZYLOPRIM) 300 mg tablet, Take 1 tablet (300 mg total) by mouth once daily.  •  albuterol HFA (PROAIR HFA) 90 mcg/actuation inhaler, Inhale 2 puffs every 4 (four) hours as needed for wheezing.  •  [] clotrimazole (LOTRIMIN) 1 % topical cream, Apply topically 2 (two) times  a day.  •  [] ipratropium (ATROVENT) 42 mcg (0.06 %) nasal spray, Administer 2 sprays into each nostril 3 (three) times a day.  •  mirtazapine (REMERON) 7.5 mg tablet, Take 1 tablet (7.5 mg total) by mouth nightly.  •  [] montelukast (SINGULAIR) 10 mg tablet, Take 1 tablet (10 mg total) by mouth nightly.    Allergies   Allergen Reactions   • Iodine And Iodide Containing Products      Other reaction(s): Syncope   Iodine containing   • Ciprofloxacin      Other reaction(s): Vomiting   • Codeine      Other reaction(s): abdominal pain   • Iodinated Contrast Media      Other reaction(s): Anaphylaxis   • Latex      Other reaction(s): Hives   • Moxifloxacin Nausea Only     Other reaction(s): Vomiting   • Moxifloxacin Hcl      Other reaction(s): hives   • Nsaids (Non-Steroidal Anti-Inflammatory Drug)      Other reaction(s): kidney failure   • Oxycodone-Acetaminophen      Other reaction(s): GI Upset, Hallucinations    • Penicillins      Other reaction(s): Hives   • Shellfish Containing Products      Other reaction(s): GI Upset   • Sulfa (Sulfonamide Antibiotics)      Other reaction(s): Hives     Review of Systems   Constitutional: Positive for appetite change (Loss of appetite) and unexpected weight change (-7 lbs since 19).   Respiratory: Positive for cough.    Gastrointestinal: Positive for constipation and nausea.   Musculoskeletal: Positive for arthralgias.   Psychiatric/Behavioral: Positive for dysphoric mood.         Objective     Vitals:    19 1545   BP: 102/60   BP Location: Left upper arm   Patient Position: Sitting   Pulse: 78   Resp: 16   Temp: 36.6 °C (97.9 °F)   TempSrc: Oral   Weight: 41.3 kg (91 lb)   Height: 1.524 m (5')     Physical Exam   Constitutional: She appears well-developed.   Thin, frail   HENT:   Head: Normocephalic.   Mouth/Throat: Oropharynx is clear and moist.   Eyes: Pupils are equal, round, and reactive to light. Conjunctivae are normal.   Neck: Neck supple. No  thyromegaly present.   Cardiovascular: Normal rate, regular rhythm and normal heart sounds.   Pulmonary/Chest: Effort normal and breath sounds normal.   Abdominal: Soft. She exhibits no distension. There is no hepatosplenomegaly. There is no tenderness.   Musculoskeletal: She exhibits no edema.   Lymphadenopathy:     She has no cervical adenopathy.   Neurological: She is alert. She exhibits normal muscle tone.   Skin: Skin is warm and dry.   Psychiatric:   Flat affect   Nursing note and vitals reviewed.      Assessment/Plan       Problem List Items Addressed This Visit     Anxiety and depression    Relevant Medications    mirtazapine (REMERON) 7.5 mg tablet    Chronic kidney disease, stage II (mild)    Relevant Orders    Basic metabolic panel (Completed)    Hypothyroidism due to Hashimoto's thyroiditis    Relevant Medications    allopurinol (ZYLOPRIM) 300 mg tablet    Other Relevant Orders    Lipid panel (Completed)    Age-related osteoporosis without current pathological fracture    Relevant Orders    DEXA BONE DENSITY    Hyponatremia    Hypomagnesemia    Relevant Orders    Magnesium (Completed)    Restless leg syndrome, controlled      Other Visit Diagnoses     Recent weight loss    -  Primary    Relevant Orders    TSH 3rd Generation (Completed)    Ambulatory referral to Nutrition Services    Decrease in appetite        Relevant Orders    Ambulatory referral to Nutrition Services        She needs to modify her diet. Declines starting to drink ensure. Encouraged to consume more protein for her weight loss. Ordered referral to nutritionist to discuss a diet that is high in protein for her weight loss.  Explained that high fat foods like donuts that she is eating will make her reflux worse. She should not eat 3 hours before bed to avoid GERD symptoms and cough she is experiencing at night.  Ordered DEXA scan to complete in 2/2020.   Prescribed remiron for loss of apppetite and depression  Ordered b/w to be taken in  office today.   Also dixcussed plan with he rson    By signing my name below, I, Tina Gaspar, attest that this documentation has been prepared under the direction and in the presence of Bandar Acharya MD      12/13/2019 8:32 AM  I, Bandar Acharya, personally performed the services described in this documentation, as documented by the scribe in my presence, and it is both accurate and complete.  12/13/2019 8:32 AM

## 2019-12-12 NOTE — PATIENT INSTRUCTIONS
Patient Education     Food Choices for Gastroesophageal Reflux Disease, Adult  When you have gastroesophageal reflux disease (GERD), the foods you eat and your eating habits are very important. Choosing the right foods can help ease your discomfort. Think about working with a nutrition specialist (dietitian) to help you make good choices.  What are tips for following this plan?    Meals  · Choose healthy foods that are low in fat, such as fruits, vegetables, whole grains, low-fat dairy products, and lean meat, fish, and poultry.  · Eat small meals often instead of 3 large meals a day. Eat your meals slowly, and in a place where you are relaxed. Avoid bending over or lying down until 2-3 hours after eating.  · Avoid eating meals 2-3 hours before bed.  · Avoid drinking a lot of liquid with meals.  · Cook foods using methods other than frying. Bake, grill, or broil food instead.  · Avoid or limit:  ? Chocolate.  ? Peppermint or spearmint.  ? Alcohol.  ? Pepper.  ? Black and decaffeinated coffee.  ? Black and decaffeinated tea.  ? Bubbly (carbonated) soft drinks.  ? Caffeinated energy drinks and soft drinks.  · Limit high-fat foods such as:  ? Fatty meat or fried foods.  ? Whole milk, cream, butter, or ice cream.  ? Nuts and nut butters.  ? Pastries, donuts, and sweets made with butter or shortening.  · Avoid foods that cause symptoms. These foods may be different for everyone. Common foods that cause symptoms include:  ? Tomatoes.  ? Oranges, juan alberto, and limes.  ? Peppers.  ? Spicy food.  ? Onions and garlic.  ? Vinegar.  Lifestyle  · Maintain a healthy weight. Ask your doctor what weight is healthy for you. If you need to lose weight, work with your doctor to do so safely.  · Exercise for at least 30 minutes for 5 or more days each week, or as told by your doctor.  · Wear loose-fitting clothes.  · Do not smoke. If you need help quitting, ask your doctor.  · Sleep with the head of your bed higher than your feet. Use a  wedge under the mattress or blocks under the bed frame to raise the head of the bed.  Summary  · When you have gastroesophageal reflux disease (GERD), food and lifestyle choices are very important in easing your symptoms.  · Eat small meals often instead of 3 large meals a day. Eat your meals slowly, and in a place where you are relaxed.  · Limit high-fat foods such as fatty meat or fried foods.  · Avoid bending over or lying down until 2-3 hours after eating.  · Avoid peppermint and spearmint, caffeine, alcohol, and chocolate.  This information is not intended to replace advice given to you by your health care provider. Make sure you discuss any questions you have with your health care provider.  Document Released: 06/18/2013 Document Revised: 01/23/2018 Document Reviewed: 01/23/2018  [employer] Interactive Patient Education © 2019 [employer]        normal... Well appearing, well nourished, awake, alert, oriented to person, place, time/situation and in no apparent distress.

## 2019-12-17 ENCOUNTER — TELEPHONE (OUTPATIENT)
Dept: FAMILY MEDICINE | Facility: CLINIC | Age: 84
End: 2019-12-17

## 2019-12-17 NOTE — TELEPHONE ENCOUNTER
Per Dr López .. Stop the medication and make sure she is eating well.  I spoke with angi and Jet and let them both know to stop medication .

## 2019-12-17 NOTE — TELEPHONE ENCOUNTER
Son-Jet is calling and mom is having a bad reacction to mirtazapine7.5mg. The medicatin you just gave her ..   She is having nightmares , not totally awake during the day. Mind is fuzzy. So Jet stopped the medication  On Sunday ..  She had a much better night  But still a little confused

## 2019-12-18 NOTE — TELEPHONE ENCOUNTER
Spoke with son and he states that she has missed a couple of her thyroid doses as soon as he noticed he is trying to get her back on track. She is also feeling a bit better but not completely she is still having nightmares, and son now believes it may be due to her missing her thyroid medication. Is there anything else he should do? Please advise.

## 2019-12-18 NOTE — TELEPHONE ENCOUNTER
I think he is doing all the right things. Let's leave the thyroid dose the same then and work on her taking it daily. Recheck tsh in 4-6 weeks.

## 2019-12-18 NOTE — TELEPHONE ENCOUNTER
LVM for patient's son  with 's message asked him ollie to give us a call back if any other questions.

## 2020-01-09 ENCOUNTER — OFFICE VISIT (OUTPATIENT)
Dept: FAMILY MEDICINE | Facility: CLINIC | Age: 85
End: 2020-01-09
Payer: MEDICARE

## 2020-01-09 VITALS
SYSTOLIC BLOOD PRESSURE: 120 MMHG | TEMPERATURE: 97.7 F | DIASTOLIC BLOOD PRESSURE: 64 MMHG | RESPIRATION RATE: 16 BRPM | HEIGHT: 60 IN | HEART RATE: 88 BPM | BODY MASS INDEX: 17.87 KG/M2 | WEIGHT: 91 LBS

## 2020-01-09 DIAGNOSIS — E06.3 HYPOTHYROIDISM DUE TO HASHIMOTO'S THYROIDITIS: ICD-10-CM

## 2020-01-09 DIAGNOSIS — M15.9 OSTEOARTHRITIS INVOLVING MULTIPLE JOINTS ON BOTH SIDES OF BODY: ICD-10-CM

## 2020-01-09 DIAGNOSIS — F41.9 ANXIETY AND DEPRESSION: ICD-10-CM

## 2020-01-09 DIAGNOSIS — K21.9 GERD WITHOUT ESOPHAGITIS: Primary | ICD-10-CM

## 2020-01-09 DIAGNOSIS — M81.0 AGE-RELATED OSTEOPOROSIS WITHOUT CURRENT PATHOLOGICAL FRACTURE: ICD-10-CM

## 2020-01-09 DIAGNOSIS — R11.0 NAUSEA: ICD-10-CM

## 2020-01-09 DIAGNOSIS — F32.A ANXIETY AND DEPRESSION: ICD-10-CM

## 2020-01-09 PROBLEM — R63.4 WEIGHT LOSS, UNINTENTIONAL: Status: RESOLVED | Noted: 2018-07-20 | Resolved: 2020-01-09

## 2020-01-09 PROBLEM — Z86.718 HISTORY OF DVT (DEEP VEIN THROMBOSIS): Status: ACTIVE | Noted: 2018-06-13

## 2020-01-09 PROCEDURE — 99214 OFFICE O/P EST MOD 30 MIN: CPT | Performed by: FAMILY MEDICINE

## 2020-01-09 RX ORDER — DEXLANSOPRAZOLE 60 MG/1
60 CAPSULE, DELAYED RELEASE ORAL DAILY
Qty: 30 CAPSULE | Refills: 6 | Status: SHIPPED | OUTPATIENT
Start: 2020-01-09 | End: 2020-04-21

## 2020-01-09 ASSESSMENT — ENCOUNTER SYMPTOMS
FEVER: 0
SHORTNESS OF BREATH: 0
COUGH: 1

## 2020-01-09 NOTE — PROGRESS NOTES
Patient ID: Lida Foley is a 84 y.o. female.        Subjective     HPI  Son reports he did find 4 tablet of thyroid med on floor. Takes med usually at 6 am. Eats at 10 am , 2 and then at dinner. She has increased her exercise.   She is experiencing some increase acid and nausea.   Arthritis hands hurt    The following have been reviewed and updated as appropriate in this visit:  Allergies  Meds  Problems       Patient Active Problem List   Diagnosis   • Anxiety and depression   • Chronic kidney disease, stage II (mild)   • Degenerative disc disease, lumbar   • Gout   • Hypothyroidism due to Hashimoto's thyroiditis   • Mild intermittent asthma without complication   • GERD without esophagitis   • Osteoarthritis involving multiple joints on both sides of body   • Chronic sinusitis   • MRSA (methicillin resistant Staphylococcus aureus)   • PAC (premature atrial contraction)   • Hyperlipidemia   • Gastroparesis   • History of DVT (deep vein thrombosis)   • Age-related osteoporosis without current pathological fracture   • Pruritus   • Chronic vasomotor rhinitis   • Stress disorder, post traumatic   • Nausea   • Hyponatremia   • Hypomagnesemia   • Elevated liver enzymes   • Vitamin D deficiency   • Nephropathy   • Restless leg syndrome, controlled       Outpatient Encounter Medications as of 1/9/2020:   •  allopurinol (ZYLOPRIM) 300 mg tablet, Take 1 tablet (300 mg total) by mouth once daily.  •  citalopram (celeXA) 20 mg tablet, Take 1 tablet (20 mg total) by mouth once daily.  •  conjugated estrogens (PREMARIN) 0.625 mg/gram vaginal cream, apply small pea sized amount to external vaginal/urethral area daily for 2 weeks then 2x/week  •  diclofenac sodium (VOLTAREN) 1 % topical gel, apply 4 grams to affected area four times a day  •  estradiol (ESTRACE) 0.01 % (0.1 mg/gram) vaginal cream, insert (1G)  by vaginal route 2x/week  •  fexofenadine (ALLEGRA) 180 mg tablet, Take 180 mg by mouth daily.  •  fluticasone  (FLONASE) 50 mcg/actuation nasal spray, Administer 1 spray into each nostril 2 (two) times a day.  •  levothyroxine (SYNTHROID) 75 mcg tablet, TAKE 1 TABLET (75 MCG TOTAL) BY MOUTH DAILY.  •  magnesium oxide 400 mg capsule, 400 mg.  •  mupirocin (BACTROBAN) 2 % ointment, 2 %.  •  pramipexole (MIRAPEX) 0.125 mg tablet, take 1 tablet by mouth once daily NIGHTLY  •  prochlorperazine (COMPAZINE) 5 mg tablet, take 1 tablet by mouth every 8 hours if needed for nausea and vomiting  •  zoledronic acid (RECLAST) IVPB, Infuse 5 mg into a venous catheter once.  •  [DISCONTINUED] mirtazapine (REMERON) 7.5 mg tablet, Take 1 tablet (7.5 mg total) by mouth nightly.  •  albuterol HFA (PROAIR HFA) 90 mcg/actuation inhaler, Inhale 2 puffs every 4 (four) hours as needed for wheezing.  •  [] clotrimazole (LOTRIMIN) 1 % topical cream, Apply topically 2 (two) times a day.  •  dexlansoprazole (DEXILANT) 60 mg capsule, Take 1 capsule (60 mg total) by mouth daily.  •  [] ipratropium (ATROVENT) 42 mcg (0.06 %) nasal spray, Administer 2 sprays into each nostril 3 (three) times a day.  •  [] montelukast (SINGULAIR) 10 mg tablet, Take 1 tablet (10 mg total) by mouth nightly.  •  [DISCONTINUED] pantoprazole (PROTONIX) 40 mg EC tablet, Take 1 tablet (40 mg total) by mouth daily.    Allergies   Allergen Reactions   • Iodine And Iodide Containing Products      Other reaction(s): Syncope   Iodine containing   • Ciprofloxacin      Other reaction(s): Vomiting   • Codeine      Other reaction(s): abdominal pain   • Iodinated Contrast Media      Other reaction(s): Anaphylaxis   • Latex      Other reaction(s): Hives   • Moxifloxacin Nausea Only     Other reaction(s): Vomiting   • Moxifloxacin Hcl      Other reaction(s): hives   • Nsaids (Non-Steroidal Anti-Inflammatory Drug)      Other reaction(s): kidney failure   • Oxycodone-Acetaminophen      Other reaction(s): GI Upset, Hallucinations    • Penicillins      Other reaction(s): Hives    • Shellfish Containing Products      Other reaction(s): GI Upset   • Sulfa (Sulfonamide Antibiotics)      Other reaction(s): Hives     Review of Systems   Constitutional: Negative for fever.   Respiratory: Positive for cough. Negative for shortness of breath.          Objective     Vitals:    01/09/20 1545   BP: 120/64   BP Location: Left upper arm   Patient Position: Sitting   Pulse: 88   Resp: 16   Temp: 36.5 °C (97.7 °F)   TempSrc: Oral   Weight: 41.3 kg (91 lb)   Height: 1.524 m (5')     Physical Exam   Constitutional: She appears well-developed and well-nourished.   HENT:   Head: Normocephalic.   Right Ear: A foreign body (cerumen) is present.   Left Ear: Tympanic membrane normal.   Mouth/Throat: Oropharynx is clear and moist.   Eyes: Pupils are equal, round, and reactive to light.   Neck: Neck supple. No thyromegaly present.   Cardiovascular: Normal rate, regular rhythm and normal heart sounds.   Pulmonary/Chest: Effort normal and breath sounds normal.   Abdominal: Soft. She exhibits no distension. There is no hepatosplenomegaly. There is no tenderness.   Musculoskeletal: She exhibits no edema.   Lymphadenopathy:     She has no cervical adenopathy.   Neurological: She is alert. She exhibits normal muscle tone.   Skin: Skin is warm and dry.   Nursing note and vitals reviewed.      Assessment/Plan       Problem List Items Addressed This Visit     Anxiety and depression    Hypothyroidism due to Hashimoto's thyroiditis    Relevant Orders    TSH    GERD without esophagitis - Primary    Relevant Medications    dexlansoprazole (DEXILANT) 60 mg capsule    Osteoarthritis involving multiple joints on both sides of body    Age-related osteoporosis without current pathological fracture    Nausea      change to dexilant  Lab in 1 month  F/u in 2 mo if doing ok otherwise sooner.

## 2020-01-17 ENCOUNTER — TELEPHONE (OUTPATIENT)
Dept: FAMILY MEDICINE | Facility: CLINIC | Age: 85
End: 2020-01-17

## 2020-01-17 NOTE — TELEPHONE ENCOUNTER
----- Message -----   From: Bandar Acharya MD   Sent: 12/15/2019   To: Lida Foley   Subject: lab review                                       Gus Hilton,   Your labs are good except the thyroid is underactive. Please let me know if you have skipped any recent doses lately.   Regards,   Bandar Acharya MD

## 2020-02-05 ENCOUNTER — TELEPHONE (OUTPATIENT)
Dept: FAMILY MEDICINE | Facility: CLINIC | Age: 85
End: 2020-02-05

## 2020-02-05 NOTE — TELEPHONE ENCOUNTER
Spoke with patients  and informed of message, he stated they were aware of message.        ----- Message -----   From: Bandar Acharya MD   Sent: 12/15/2019   To: Lida Foley   Subject: lab review                                       Gus Hilton,   Your labs are good except the thyroid is underactive. Please let me know if you have skipped any recent doses lately.   Regards,   Bandar Acharya MD

## 2020-03-02 DIAGNOSIS — E06.3 HYPOTHYROIDISM DUE TO HASHIMOTO'S THYROIDITIS: Primary | ICD-10-CM

## 2020-03-02 RX ORDER — LEVOTHYROXINE SODIUM 75 UG/1
TABLET ORAL
Qty: 30 TABLET | Refills: 3 | Status: SHIPPED | OUTPATIENT
Start: 2020-03-02 | End: 2020-03-10

## 2020-03-09 ENCOUNTER — OFFICE VISIT (OUTPATIENT)
Dept: FAMILY MEDICINE | Facility: CLINIC | Age: 85
End: 2020-03-09
Payer: MEDICARE

## 2020-03-09 VITALS
HEART RATE: 65 BPM | HEIGHT: 60 IN | TEMPERATURE: 98 F | BODY MASS INDEX: 17.79 KG/M2 | RESPIRATION RATE: 16 BRPM | SYSTOLIC BLOOD PRESSURE: 100 MMHG | WEIGHT: 90.6 LBS | DIASTOLIC BLOOD PRESSURE: 62 MMHG

## 2020-03-09 DIAGNOSIS — J45.20 MILD INTERMITTENT ASTHMA WITHOUT COMPLICATION: Primary | ICD-10-CM

## 2020-03-09 DIAGNOSIS — K21.9 GERD WITHOUT ESOPHAGITIS: ICD-10-CM

## 2020-03-09 DIAGNOSIS — M81.0 AGE-RELATED OSTEOPOROSIS WITHOUT CURRENT PATHOLOGICAL FRACTURE: ICD-10-CM

## 2020-03-09 DIAGNOSIS — F32.A ANXIETY AND DEPRESSION: ICD-10-CM

## 2020-03-09 DIAGNOSIS — F41.9 ANXIETY AND DEPRESSION: ICD-10-CM

## 2020-03-09 DIAGNOSIS — M51.369 DEGENERATIVE DISC DISEASE, LUMBAR: ICD-10-CM

## 2020-03-09 DIAGNOSIS — N39.46 MIXED STRESS AND URGE URINARY INCONTINENCE: ICD-10-CM

## 2020-03-09 DIAGNOSIS — N18.2 CHRONIC KIDNEY DISEASE, STAGE II (MILD): ICD-10-CM

## 2020-03-09 DIAGNOSIS — E06.3 HYPOTHYROIDISM DUE TO HASHIMOTO'S THYROIDITIS: ICD-10-CM

## 2020-03-09 PROBLEM — L29.9 PRURITUS: Status: RESOLVED | Noted: 2018-09-21 | Resolved: 2020-03-09

## 2020-03-09 PROCEDURE — 84439 ASSAY OF FREE THYROXINE: CPT | Performed by: FAMILY MEDICINE

## 2020-03-09 PROCEDURE — 84443 ASSAY THYROID STIM HORMONE: CPT | Performed by: FAMILY MEDICINE

## 2020-03-09 PROCEDURE — 99214 OFFICE O/P EST MOD 30 MIN: CPT | Performed by: FAMILY MEDICINE

## 2020-03-09 PROCEDURE — 36415 COLL VENOUS BLD VENIPUNCTURE: CPT | Performed by: FAMILY MEDICINE

## 2020-03-09 ASSESSMENT — ENCOUNTER SYMPTOMS
DYSURIA: 0
COUGH: 1

## 2020-03-09 NOTE — PROGRESS NOTES
Patient ID: Lida Foley is a 84 y.o. female.        Subjective     HPI  Here for f/u  Gets bitter taste in her mouth.   Some right sided back pain. Gets her back injection next month for her DDD. Gets some pain also in her feet and gets monthy injections for that. In warmer weather she gets out  Her son still says she is distributing her sleep breaks it up and total is normal. Has cut down on sugar. Eating some healthier choices. Lots more fruit. Eating her protein well. Doing well on her thyroid med. Having to lean on her left shoulder to get up from bed and it hurts her hand. Arthritis in her hands. Asking for hospital bed to help elevate more easily.     The following have been reviewed and updated as appropriate in this visit:  Allergies  Meds  Problems       Patient Active Problem List   Diagnosis   • Anxiety and depression   • Chronic kidney disease, stage II (mild)   • Degenerative disc disease, lumbar   • Gout   • Hypothyroidism due to Hashimoto's thyroiditis   • Mild intermittent asthma without complication   • GERD without esophagitis   • Osteoarthritis involving multiple joints on both sides of body   • Chronic sinusitis   • MRSA (methicillin resistant Staphylococcus aureus)   • PAC (premature atrial contraction)   • Hyperlipidemia   • Gastroparesis   • History of DVT (deep vein thrombosis)   • Age-related osteoporosis without current pathological fracture   • Chronic vasomotor rhinitis   • Stress disorder, post traumatic   • Nausea   • Hyponatremia   • Hypomagnesemia   • Elevated liver enzymes   • Vitamin D deficiency   • Nephropathy   • Restless leg syndrome, controlled   • Mixed stress and urge urinary incontinence       Outpatient Encounter Medications as of 3/9/2020:   •  albuterol HFA (PROAIR HFA) 90 mcg/actuation inhaler, Inhale 2 puffs every 4 (four) hours as needed for wheezing.  •  allopurinol (ZYLOPRIM) 300 mg tablet, Take 1 tablet (300 mg total) by mouth once daily.  •  citalopram  (celeXA) 20 mg tablet, Take 1 tablet (20 mg total) by mouth once daily.  •  conjugated estrogens (PREMARIN) 0.625 mg/gram vaginal cream, apply small pea sized amount to external vaginal/urethral area daily for 2 weeks then 2x/week  •  dexlansoprazole (DEXILANT) 60 mg capsule, Take 1 capsule (60 mg total) by mouth daily.  •  diclofenac sodium (VOLTAREN) 1 % topical gel, apply 4 grams to affected area four times a day  •  estradiol (ESTRACE) 0.01 % (0.1 mg/gram) vaginal cream, insert (1G)  by vaginal route 2x/week  •  fexofenadine (ALLEGRA) 180 mg tablet, Take 180 mg by mouth daily.  •  fluticasone (FLONASE) 50 mcg/actuation nasal spray, Administer 1 spray into each nostril 2 (two) times a day.  •  magnesium oxide 400 mg capsule, 400 mg.  •  mupirocin (BACTROBAN) 2 % ointment, 2 %.  •  pramipexole (MIRAPEX) 0.125 mg tablet, take 1 tablet by mouth once daily NIGHTLY  •  prochlorperazine (COMPAZINE) 5 mg tablet, take 1 tablet by mouth every 8 hours if needed for nausea and vomiting  •  zoledronic acid (RECLAST) IVPB, Infuse 5 mg into a venous catheter once.  •  [DISCONTINUED] levothyroxine (SYNTHROID) 75 mcg tablet, take 1 tablet by mouth once daily    Allergies   Allergen Reactions   • Iodine And Iodide Containing Products      Other reaction(s): Syncope   Iodine containing   • Ciprofloxacin      Other reaction(s): Vomiting   • Codeine      Other reaction(s): abdominal pain   • Iodinated Contrast Media      Other reaction(s): Anaphylaxis   • Latex      Other reaction(s): Hives   • Moxifloxacin Nausea Only     Other reaction(s): Vomiting   • Moxifloxacin Hcl      Other reaction(s): hives   • Nsaids (Non-Steroidal Anti-Inflammatory Drug)      Other reaction(s): kidney failure   • Oxycodone-Acetaminophen      Other reaction(s): GI Upset, Hallucinations    • Penicillins      Other reaction(s): Hives   • Shellfish Containing Products      Other reaction(s): GI Upset   • Sulfa (Sulfonamide Antibiotics)      Other  reaction(s): Hives     Review of Systems   HENT: Positive for postnasal drip.    Respiratory: Positive for cough.    Gastrointestinal:        Reflux   Genitourinary: Negative for dysuria.         Objective     Vitals:    03/09/20 1303   BP: 100/62   BP Location: Left upper arm   Patient Position: Sitting   Pulse: 65   Resp: 16   Temp: 36.7 °C (98 °F)   TempSrc: Oral   Weight: 41.1 kg (90 lb 9.6 oz)   Height: 1.524 m (5')     Physical Exam   Constitutional: She appears well-developed and well-nourished.   HENT:   Head: Normocephalic.   Mouth/Throat: Oropharynx is clear and moist.   Eyes: Pupils are equal, round, and reactive to light.   Neck: Neck supple. No thyromegaly present.   Cardiovascular: Normal rate, regular rhythm and normal heart sounds.   Pulmonary/Chest: Effort normal and breath sounds normal.   Abdominal: Soft. She exhibits no distension. There is no hepatosplenomegaly. There is no tenderness.   Musculoskeletal: She exhibits no edema.   Lymphadenopathy:     She has no cervical adenopathy.   Neurological: She is alert. She exhibits normal muscle tone.   Skin: Skin is warm and dry.   Nursing note and vitals reviewed.      Assessment/Plan       Problem List Items Addressed This Visit     Anxiety and depression     stable         Chronic kidney disease, stage II (mild)    Degenerative disc disease, lumbar    Hypothyroidism due to Hashimoto's thyroiditis    Relevant Orders    TSH w reflex FT4 (Completed)    T4, free (Completed)    Mild intermittent asthma without complication - Primary    GERD without esophagitis    Age-related osteoporosis without current pathological fracture    Mixed stress and urge urinary incontinence      needs order for hospital bed.   Due for thyroid f/u testing  Asthma controlled  GERD ongoing remain on PPI  Arthritis ongoing issue. We discussed steroid injections can be less effective over time.  Encourage improved eating habits.  F/u 3 month

## 2020-03-10 DIAGNOSIS — E06.3 HYPOTHYROIDISM DUE TO HASHIMOTO'S THYROIDITIS: Primary | ICD-10-CM

## 2020-03-10 LAB
T4 FREE SERPL-MCNC: 0.76 NG/DL (ref 0.58–1.64)
TSH SERPL DL<=0.05 MIU/L-ACNC: 20.51 MIU/L (ref 0.34–5.6)

## 2020-03-10 RX ORDER — LEVOTHYROXINE SODIUM 88 UG/1
88 TABLET ORAL
Qty: 30 TABLET | Refills: 1 | Status: SHIPPED | OUTPATIENT
Start: 2020-03-10 | End: 2020-05-29

## 2020-03-14 ASSESSMENT — ENCOUNTER SYMPTOMS: ROS GI COMMENTS: REFLUX

## 2020-03-15 DIAGNOSIS — F32.A ANXIETY AND DEPRESSION: ICD-10-CM

## 2020-03-15 DIAGNOSIS — F41.9 ANXIETY AND DEPRESSION: ICD-10-CM

## 2020-03-16 RX ORDER — CITALOPRAM 20 MG/1
TABLET, FILM COATED ORAL
Qty: 90 TABLET | Refills: 1 | Status: SHIPPED | OUTPATIENT
Start: 2020-03-16 | End: 2020-09-14

## 2020-04-21 ENCOUNTER — TELEPHONE (OUTPATIENT)
Dept: FAMILY MEDICINE | Facility: CLINIC | Age: 85
End: 2020-04-21

## 2020-04-21 ENCOUNTER — TELEMEDICINE (OUTPATIENT)
Dept: FAMILY MEDICINE | Facility: CLINIC | Age: 85
End: 2020-04-21
Payer: MEDICARE

## 2020-04-21 DIAGNOSIS — M15.9 OSTEOARTHRITIS INVOLVING MULTIPLE JOINTS ON BOTH SIDES OF BODY: Primary | ICD-10-CM

## 2020-04-21 DIAGNOSIS — F41.9 ANXIETY AND DEPRESSION: ICD-10-CM

## 2020-04-21 DIAGNOSIS — F32.A ANXIETY AND DEPRESSION: ICD-10-CM

## 2020-04-21 PROCEDURE — 99441 PR PHYS/QHP TELEPHONE EVALUATION 5-10 MIN: CPT | Performed by: FAMILY MEDICINE

## 2020-04-21 RX ORDER — OMEPRAZOLE 40 MG/1
40 CAPSULE, DELAYED RELEASE ORAL
COMMUNITY
Start: 2020-04-16

## 2020-04-21 NOTE — PROGRESS NOTES
Request for Consent:  You and I are about to have a telemedicine check-in or visit. This is allowed because you are already my patient, and you have requested it.  This telemedicine visit will be billed to your health insurance or you, if you are self-insured.  You understand you will be responsible for any copayments or coinsurances that apply to your telemedicine visit.  Before starting our telemedicine visit, I am required to get your consent for this virtual check-in or visit by telemedicine. Do you consent?      Patient Response to Request for Consent: Yes    The following have been reviewed and updated as appropriate in this visit:         Visit Documentation:    C/o increased anxiety. Increased irritabililty and having back pain. Has gained about 5 pounds. Using lidocaine patch. Shoulder and hip and knees hurt.weight is 95.3#. No fall or injury. She is taking duloxetine twice daily. Her last injections in her back was < 4 month ago. Sleep can be interupted.   Her son helped with visit and communication. Lida answered questions appropriately.    Diagnoses and all orders for this visit:    Osteoarthritis involving multiple joints on both sides of body    Anxiety and depression        Increase duloxetine 60 mg daily.   Continue other meds.  If not improving   let me know  Will work on getting hospital bed.        Time Spent in Medical Discussion During This Encounter:  10 minutes

## 2020-04-21 NOTE — TELEPHONE ENCOUNTER
She needs a hospital bed. Order on a paper rx and put on my desk for signature. Dx: osteoarthritis multiple sites, weakness. Osteoporosis, GERD. COPD.

## 2020-04-27 ENCOUNTER — TELEPHONE (OUTPATIENT)
Dept: FAMILY MEDICINE | Facility: CLINIC | Age: 85
End: 2020-04-27

## 2020-04-27 NOTE — TELEPHONE ENCOUNTER
Where was the hospital bed script faxed to? Can not find it here. Son is very upset and would like a phone call from you. Tried to calm the son but he said they have been waiting since 3/9 for the hospital bed. Miguel Ángel Chaudhary: [phone]

## 2020-04-27 NOTE — TELEPHONE ENCOUNTER
Where was the script for the hospital bed fax to? Can not find it in our office. Son is very upset and has been trying to get this since 3/9/30. He would like a call from you when you get a chance I spoke with him and tried to calm him down explained

## 2020-04-27 NOTE — TELEPHONE ENCOUNTER
I called her son Jet to go over the plan for ordering the hospital bed.  He was quite angry.  We had discussed last week when I did her telemedicine visit that I had overlooked ordering her order on March 9 and had apologized for that.  He feels that he had called the office 4 times and his calls had gone unheeded I explained that I did not get any messages between March 9 and April 21 to let me know that she still needed the order for the bed.  I explained to him that we had talked about it last week and I had made sure to initiate an order after that point in time.  I explained to him that there are very specific criteria for qualifying for a hospital bed and wanted to go over some specific questions with him.  Amongst his angry comments I did gather the following: he does not believe that she has aspirated.  He feels that she needs a bed that can allow repositioning not feasible with an ordinary bed.  He also believes her COPD is a factor. He reports that she fell over the weekend.     Beyond this he was angry and would not accept my explanation or plans. I tried to to explain that we will be faxing a letter to the medical equipment company tomorrow.  He was bringing up an order from a year ago.  Advised him I did not have that information currently available to me but we will work on an order that will get faxed tomorrow.  Unfortunately he was belligerent and talking over me to the point that I let him know 2 or 3 times that I would be ending the conversation.  I did finally end the phone call when it was clear that I could not effectively communicate with him today about this issue.

## 2020-04-27 NOTE — LETTER
April 28, 2020     Lida Foley    Patient: Lida Foley   YOB: 1935   Date of Visit: 4/27/2020       To whom it may concern:    I am referring my patient, Lida Foley, to you for evaluation of hospital bed.     She is in need of a hospital bed for the following reasons:   1) She has extensive osteoarthritis, osteoporosis and chronic back pain which makes repositioning with an ordinary bed not feasible.  2) She has chronic reflux and elevation of bed >30 degrees is recommended.  3) Improved positioning and frequent repositioning will help alleviate pain    Length of need: 99  Diagnosis: M51.36, M15.9, K21.9, M62.81           Sincerely,                             Bandar Acharya MD        CC: No Recipients

## 2020-04-27 NOTE — TELEPHONE ENCOUNTER
Son called was very upset that since 3/9/20 he has been asking for a script for a hospital bed for his mother. Would you like me to write one and fax to you or leave it on your desk for you to sign?

## 2020-04-27 NOTE — TELEPHONE ENCOUNTER
Son Jet lm with the service  about a script for a hospital bed.     Jet is a little upset because this has been going on since 03/09/020 when they were here for the visit

## 2020-04-27 NOTE — TELEPHONE ENCOUNTER
Called  [facility]  [phone]   Fax [phone]  They would need a script including diagnosis code and length of need for equipment  Also needed is a chart note which states that pt needs of body position is not feasible with ordinary bed.  And frequent repositioning or HOB elevated needed.   Patient information including address and phone number needs to be included  Along with insurance information.  Company stated bed could be delivered within a day when ever they receive the above  Called son after speaking with medical equipment company. Son did not want to hear anything I had to say. Tried to explain to him the process. He said we faxed the prescription to a place that went out of business over a year ago. When I would ask him who told him that he said [facility] home [facility]. He would not give me the number he called only said he was speaking to kAanksha. Although I tried to calm him down he kept reverting back to 3/9/20 , I apologized and explained I am trying to help him and resolve this now he didn't want to hear my explanation.

## 2020-04-27 NOTE — TELEPHONE ENCOUNTER
Ultimately this is an order for durable medical equipment company. I signed it on a prescription pad order so it will be a photocopy that got faxed back. Call [employer] equipment company and find out how we can initiate an order directly with them.

## 2020-04-28 NOTE — TELEPHONE ENCOUNTER
Katy has all the paper work and will fax to [employer] and she will call and tell them to get in touch with patient

## 2020-04-28 NOTE — TELEPHONE ENCOUNTER
All relevant paperwork faxed to Jodi with [facility] at [phone]. Jodi stated the company will get in touch with the patient for deivery

## 2020-04-28 NOTE — TELEPHONE ENCOUNTER
Faxed paper work did not go through. Spoke with tatyana at [employer] and emailed the paper work to   [email or web address] called company to confirm receipt Faxed again to another number [phone] and got confirmation that it was received

## 2020-04-28 NOTE — TELEPHONE ENCOUNTER
The letter is ready for sending to DME company. It can be found in the letter tab of chart. Pls include other necessary demographic information and insurance.

## 2020-05-08 ENCOUNTER — TELEPHONE (OUTPATIENT)
Dept: FAMILY MEDICINE | Facility: CLINIC | Age: 85
End: 2020-05-08

## 2020-05-29 DIAGNOSIS — E06.3 HYPOTHYROIDISM DUE TO HASHIMOTO'S THYROIDITIS: ICD-10-CM

## 2020-05-29 RX ORDER — LEVOTHYROXINE SODIUM 88 UG/1
TABLET ORAL
Qty: 30 TABLET | Refills: 1 | Status: SHIPPED | OUTPATIENT
Start: 2020-05-29 | End: 2020-08-03

## 2020-05-29 NOTE — TELEPHONE ENCOUNTER
Medicine Refill Request    Last Office Visit: 3/9/2020  Last Telemedicine Visit: 4/21/2020 Bandar Acharya MD    Next Office Visit: Visit date not found  Next Telemedicine Visit: Visit date not found         Current Outpatient Medications:   •  albuterol HFA (PROAIR HFA) 90 mcg/actuation inhaler, Inhale 2 puffs every 4 (four) hours as needed for wheezing., Disp: 1 Inhaler, Rfl: 1  •  allopurinol (ZYLOPRIM) 300 mg tablet, Take 1 tablet (300 mg total) by mouth once daily., Disp: 90 tablet, Rfl: 1  •  citalopram (celeXA) 20 mg tablet, take 1 tablet by mouth once daily, Disp: 90 tablet, Rfl: 1  •  conjugated estrogens (PREMARIN) 0.625 mg/gram vaginal cream, apply small pea sized amount to external vaginal/urethral area daily for 2 weeks then 2x/week, Disp: , Rfl:   •  diclofenac sodium (VOLTAREN) 1 % topical gel, apply 4 grams to affected area four times a day, Disp: , Rfl: 0  •  estradiol (ESTRACE) 0.01 % (0.1 mg/gram) vaginal cream, insert (1G)  by vaginal route 2x/week, Disp: , Rfl:   •  fexofenadine (ALLEGRA) 180 mg tablet, Take 180 mg by mouth daily., Disp: , Rfl:   •  fluticasone (FLONASE) 50 mcg/actuation nasal spray, Administer 1 spray into each nostril 2 (two) times a day., Disp: 1 Bottle, Rfl: 11  •  levothyroxine (SYNTHROID) 88 mcg tablet, Take 1 tablet (88 mcg total) by mouth daily., Disp: 30 tablet, Rfl: 1  •  magnesium oxide 400 mg capsule, 400 mg., Disp: , Rfl:   •  mupirocin (BACTROBAN) 2 % ointment, 2 %., Disp: , Rfl:   •  omeprazole (PriLOSEC) 40 mg capsule, Take 40 mg by mouth 2 (two) times a day., Disp: , Rfl:   •  pramipexole (MIRAPEX) 0.125 mg tablet, TAKE 1 TABLET AT NIGHT, Disp: 30 tablet, Rfl: 6  •  prochlorperazine (COMPAZINE) 5 mg tablet, take 1 tablet by mouth every 8 hours if needed for nausea and vomiting, Disp: 30 tablet, Rfl: 1  •  zoledronic acid (RECLAST) IVPB, Infuse 5 mg into a venous catheter once., Disp: , Rfl:       BP Readings from Last 3 Encounters:   03/09/20 100/62    01/09/20 120/64   12/12/19 102/60       Recent Lab results:  Lab Results   Component Value Date    CHOL 208 (H) 12/12/2019   ,   Lab Results   Component Value Date    HDL 68 12/12/2019   ,   Lab Results   Component Value Date    LDLCALC 118 (H) 12/12/2019   ,   Lab Results   Component Value Date    TRIG 110 12/12/2019        Lab Results   Component Value Date    GLUCOSE 90 12/12/2019   , No results found for: HGBA1C      Lab Results   Component Value Date    CREATININE 0.7 12/12/2019       Lab Results   Component Value Date    TSH 20.51 (H) 03/09/2020

## 2020-06-02 RX ORDER — ALLOPURINOL 300 MG/1
TABLET ORAL
Qty: 90 TABLET | Refills: 1 | Status: SHIPPED | OUTPATIENT
Start: 2020-06-02 | End: 2020-12-08

## 2020-06-02 NOTE — TELEPHONE ENCOUNTER
Medicine Refill Request    Last Office Visit: 3/9/2020  Last Telemedicine Visit: 4/21/2020 Bandar Acharya MD    Next Office Visit: Visit date not found  Next Telemedicine Visit: Visit date not found         Current Outpatient Medications:   •  albuterol HFA (PROAIR HFA) 90 mcg/actuation inhaler, Inhale 2 puffs every 4 (four) hours as needed for wheezing., Disp: 1 Inhaler, Rfl: 1  •  allopurinol (ZYLOPRIM) 300 mg tablet, Take 1 tablet (300 mg total) by mouth once daily., Disp: 90 tablet, Rfl: 1  •  citalopram (celeXA) 20 mg tablet, take 1 tablet by mouth once daily, Disp: 90 tablet, Rfl: 1  •  conjugated estrogens (PREMARIN) 0.625 mg/gram vaginal cream, apply small pea sized amount to external vaginal/urethral area daily for 2 weeks then 2x/week, Disp: , Rfl:   •  diclofenac sodium (VOLTAREN) 1 % topical gel, apply 4 grams to affected area four times a day, Disp: , Rfl: 0  •  estradiol (ESTRACE) 0.01 % (0.1 mg/gram) vaginal cream, insert (1G)  by vaginal route 2x/week, Disp: , Rfl:   •  fexofenadine (ALLEGRA) 180 mg tablet, Take 180 mg by mouth daily., Disp: , Rfl:   •  fluticasone (FLONASE) 50 mcg/actuation nasal spray, Administer 1 spray into each nostril 2 (two) times a day., Disp: 1 Bottle, Rfl: 11  •  levothyroxine (SYNTHROID) 88 mcg tablet, take 1 tablet by mouth once daily, Disp: 30 tablet, Rfl: 1  •  magnesium oxide 400 mg capsule, 400 mg., Disp: , Rfl:   •  mupirocin (BACTROBAN) 2 % ointment, 2 %., Disp: , Rfl:   •  omeprazole (PriLOSEC) 40 mg capsule, Take 40 mg by mouth 2 (two) times a day., Disp: , Rfl:   •  pramipexole (MIRAPEX) 0.125 mg tablet, TAKE 1 TABLET AT NIGHT, Disp: 30 tablet, Rfl: 6  •  prochlorperazine (COMPAZINE) 5 mg tablet, take 1 tablet by mouth every 8 hours if needed for nausea and vomiting, Disp: 30 tablet, Rfl: 1  •  zoledronic acid (RECLAST) IVPB, Infuse 5 mg into a venous catheter once., Disp: , Rfl:       BP Readings from Last 3 Encounters:   03/09/20 100/62   01/09/20  120/64   12/12/19 102/60       Recent Lab results:  Lab Results   Component Value Date    CHOL 208 (H) 12/12/2019   ,   Lab Results   Component Value Date    HDL 68 12/12/2019   ,   Lab Results   Component Value Date    LDLCALC 118 (H) 12/12/2019   ,   Lab Results   Component Value Date    TRIG 110 12/12/2019        Lab Results   Component Value Date    GLUCOSE 90 12/12/2019   , No results found for: HGBA1C      Lab Results   Component Value Date    CREATININE 0.7 12/12/2019       Lab Results   Component Value Date    TSH 20.51 (H) 03/09/2020

## 2020-08-02 DIAGNOSIS — E06.3 HYPOTHYROIDISM DUE TO HASHIMOTO'S THYROIDITIS: ICD-10-CM

## 2020-08-03 RX ORDER — LEVOTHYROXINE SODIUM 88 UG/1
TABLET ORAL
Qty: 30 TABLET | Refills: 1 | Status: SHIPPED | OUTPATIENT
Start: 2020-08-03 | End: 2020-09-29 | Stop reason: SDUPTHER

## 2020-08-03 NOTE — TELEPHONE ENCOUNTER
Medicine Refill Request    Last Office Visit: 3/9/2020  Last Telemedicine Visit: 4/21/2020 Bandar Acharya MD    Next Office Visit: Visit date not found  Next Telemedicine Visit: Visit date not found         Current Outpatient Medications:   •  albuterol HFA (PROAIR HFA) 90 mcg/actuation inhaler, Inhale 2 puffs every 4 (four) hours as needed for wheezing., Disp: 1 Inhaler, Rfl: 1  •  allopurinoL (ZYLOPRIM) 300 mg tablet, take 1 tablet by mouth once daily, Disp: 90 tablet, Rfl: 1  •  citalopram (celeXA) 20 mg tablet, take 1 tablet by mouth once daily, Disp: 90 tablet, Rfl: 1  •  conjugated estrogens (PREMARIN) 0.625 mg/gram vaginal cream, apply small pea sized amount to external vaginal/urethral area daily for 2 weeks then 2x/week, Disp: , Rfl:   •  diclofenac sodium (VOLTAREN) 1 % topical gel, apply 4 grams to affected area four times a day, Disp: , Rfl: 0  •  estradiol (ESTRACE) 0.01 % (0.1 mg/gram) vaginal cream, insert (1G)  by vaginal route 2x/week, Disp: , Rfl:   •  fexofenadine (ALLEGRA) 180 mg tablet, Take 180 mg by mouth daily., Disp: , Rfl:   •  fluticasone (FLONASE) 50 mcg/actuation nasal spray, Administer 1 spray into each nostril 2 (two) times a day., Disp: 1 Bottle, Rfl: 11  •  levothyroxine (SYNTHROID) 88 mcg tablet, take 1 tablet by mouth once daily, Disp: 30 tablet, Rfl: 1  •  magnesium oxide 400 mg capsule, 400 mg., Disp: , Rfl:   •  mupirocin (BACTROBAN) 2 % ointment, 2 %., Disp: , Rfl:   •  omeprazole (PriLOSEC) 40 mg capsule, Take 40 mg by mouth 2 (two) times a day., Disp: , Rfl:   •  pramipexole (MIRAPEX) 0.125 mg tablet, TAKE 1 TABLET AT NIGHT, Disp: 30 tablet, Rfl: 6  •  prochlorperazine (COMPAZINE) 5 mg tablet, take 1 tablet by mouth every 8 hours if needed for nausea and vomiting, Disp: 30 tablet, Rfl: 1  •  zoledronic acid (RECLAST) IVPB, Infuse 5 mg into a venous catheter once., Disp: , Rfl:       BP Readings from Last 3 Encounters:   03/09/20 100/62   01/09/20 120/64   12/12/19  102/60       Recent Lab results:  Lab Results   Component Value Date    CHOL 208 (H) 12/12/2019   ,   Lab Results   Component Value Date    HDL 68 12/12/2019   ,   Lab Results   Component Value Date    LDLCALC 118 (H) 12/12/2019   ,   Lab Results   Component Value Date    TRIG 110 12/12/2019        Lab Results   Component Value Date    GLUCOSE 90 12/12/2019   , No results found for: HGBA1C      Lab Results   Component Value Date    CREATININE 0.7 12/12/2019       Lab Results   Component Value Date    TSH 20.51 (H) 03/09/2020

## 2020-09-14 DIAGNOSIS — F32.A ANXIETY AND DEPRESSION: ICD-10-CM

## 2020-09-14 DIAGNOSIS — F41.9 ANXIETY AND DEPRESSION: ICD-10-CM

## 2020-09-14 RX ORDER — CITALOPRAM 20 MG/1
TABLET, FILM COATED ORAL
Qty: 90 TABLET | Refills: 1 | Status: SHIPPED | OUTPATIENT
Start: 2020-09-14 | End: 2021-01-05

## 2020-09-29 DIAGNOSIS — E06.3 HYPOTHYROIDISM DUE TO HASHIMOTO'S THYROIDITIS: ICD-10-CM

## 2020-09-29 NOTE — TELEPHONE ENCOUNTER
Medicine Refill Request  Last TSH 3/9/2020  Last Office Visit: Visit date not found  Last Telemedicine Visit: 4/21/2020    Next Office Visit: Visit date not found  Next Telemedicine Visit: Visit date not found         Current Outpatient Medications:   •  albuterol HFA (PROAIR HFA) 90 mcg/actuation inhaler, Inhale 2 puffs every 4 (four) hours as needed for wheezing., Disp: 1 Inhaler, Rfl: 1  •  allopurinoL (ZYLOPRIM) 300 mg tablet, take 1 tablet by mouth once daily, Disp: 90 tablet, Rfl: 1  •  citalopram (celeXA) 20 mg tablet, take 1 tablet by mouth once daily, Disp: 90 tablet, Rfl: 1  •  conjugated estrogens (PREMARIN) 0.625 mg/gram vaginal cream, apply small pea sized amount to external vaginal/urethral area daily for 2 weeks then 2x/week, Disp: , Rfl:   •  diclofenac sodium (VOLTAREN) 1 % topical gel, apply 4 grams to affected area four times a day, Disp: , Rfl: 0  •  estradiol (ESTRACE) 0.01 % (0.1 mg/gram) vaginal cream, insert (1G)  by vaginal route 2x/week, Disp: , Rfl:   •  fexofenadine (ALLEGRA) 180 mg tablet, Take 180 mg by mouth daily., Disp: , Rfl:   •  fluticasone (FLONASE) 50 mcg/actuation nasal spray, Administer 1 spray into each nostril 2 (two) times a day., Disp: 1 Bottle, Rfl: 11  •  levothyroxine (SYNTHROID) 88 mcg tablet, take 1 tablet by mouth once daily, Disp: 30 tablet, Rfl: 1  •  magnesium oxide 400 mg capsule, 400 mg., Disp: , Rfl:   •  mupirocin (BACTROBAN) 2 % ointment, 2 %., Disp: , Rfl:   •  omeprazole (PriLOSEC) 40 mg capsule, Take 40 mg by mouth 2 (two) times a day., Disp: , Rfl:   •  pramipexole (MIRAPEX) 0.125 mg tablet, TAKE 1 TABLET AT NIGHT, Disp: 30 tablet, Rfl: 6  •  prochlorperazine (COMPAZINE) 5 mg tablet, take 1 tablet by mouth every 8 hours if needed for nausea and vomiting, Disp: 30 tablet, Rfl: 1  •  zoledronic acid (RECLAST) IVPB, Infuse 5 mg into a venous catheter once., Disp: , Rfl:       BP Readings from Last 3 Encounters:   03/09/20 100/62   01/09/20 120/64   12/12/19  102/60       Recent Lab results:  Lab Results   Component Value Date    CHOL 208 (H) 12/12/2019   ,   Lab Results   Component Value Date    HDL 68 12/12/2019   ,   Lab Results   Component Value Date    LDLCALC 118 (H) 12/12/2019   ,   Lab Results   Component Value Date    TRIG 110 12/12/2019        Lab Results   Component Value Date    GLUCOSE 90 12/12/2019   , No results found for: HGBA1C      Lab Results   Component Value Date    CREATININE 0.7 12/12/2019       Lab Results   Component Value Date    TSH 20.51 (H) 03/09/2020

## 2020-09-30 RX ORDER — LEVOTHYROXINE SODIUM 88 UG/1
88 TABLET ORAL
Qty: 30 TABLET | Refills: 1 | Status: SHIPPED | OUTPATIENT
Start: 2020-09-30 | End: 2020-11-27

## 2020-11-27 DIAGNOSIS — E06.3 HYPOTHYROIDISM DUE TO HASHIMOTO'S THYROIDITIS: ICD-10-CM

## 2020-11-27 RX ORDER — LEVOTHYROXINE SODIUM 88 UG/1
TABLET ORAL
Qty: 30 TABLET | Refills: 1 | Status: SHIPPED | OUTPATIENT
Start: 2020-11-27 | End: 2021-01-22

## 2020-11-27 NOTE — TELEPHONE ENCOUNTER
Medicine Refill Request    Last Office Visit: Visit date not found  Last Telemedicine Visit: Visit date not found    Next Office Visit: 12/16/2020  Next Telemedicine Visit: Visit date not found         Current Outpatient Medications:   •  albuterol HFA (PROAIR HFA) 90 mcg/actuation inhaler, Inhale 2 puffs every 4 (four) hours as needed for wheezing., Disp: 1 Inhaler, Rfl: 1  •  allopurinoL (ZYLOPRIM) 300 mg tablet, take 1 tablet by mouth once daily, Disp: 90 tablet, Rfl: 1  •  citalopram (celeXA) 20 mg tablet, take 1 tablet by mouth once daily, Disp: 90 tablet, Rfl: 1  •  conjugated estrogens (PREMARIN) 0.625 mg/gram vaginal cream, apply small pea sized amount to external vaginal/urethral area daily for 2 weeks then 2x/week, Disp: , Rfl:   •  diclofenac sodium (VOLTAREN) 1 % topical gel, apply 4 grams to affected area four times a day, Disp: , Rfl: 0  •  estradiol (ESTRACE) 0.01 % (0.1 mg/gram) vaginal cream, insert (1G)  by vaginal route 2x/week, Disp: , Rfl:   •  fexofenadine (ALLEGRA) 180 mg tablet, Take 180 mg by mouth daily., Disp: , Rfl:   •  fluticasone (FLONASE) 50 mcg/actuation nasal spray, Administer 1 spray into each nostril 2 (two) times a day., Disp: 1 Bottle, Rfl: 11  •  levothyroxine (SYNTHROID) 88 mcg tablet, Take 1 tablet (88 mcg total) by mouth once daily., Disp: 30 tablet, Rfl: 1  •  magnesium oxide 400 mg capsule, 400 mg., Disp: , Rfl:   •  mupirocin (BACTROBAN) 2 % ointment, 2 %., Disp: , Rfl:   •  omeprazole (PriLOSEC) 40 mg capsule, Take 40 mg by mouth 2 (two) times a day., Disp: , Rfl:   •  pramipexole (MIRAPEX) 0.125 mg tablet, TAKE 1 TABLET AT NIGHT, Disp: 30 tablet, Rfl: 6  •  prochlorperazine (COMPAZINE) 5 mg tablet, take 1 tablet by mouth every 8 hours if needed for nausea and vomiting, Disp: 30 tablet, Rfl: 1  •  zoledronic acid (RECLAST) IVPB, Infuse 5 mg into a venous catheter once., Disp: , Rfl:       BP Readings from Last 3 Encounters:   03/09/20 100/62   01/09/20 120/64   12/12/19  102/60       Recent Lab results:  Lab Results   Component Value Date    CHOL 208 (H) 12/12/2019   ,   Lab Results   Component Value Date    HDL 68 12/12/2019   ,   Lab Results   Component Value Date    LDLCALC 118 (H) 12/12/2019   ,   Lab Results   Component Value Date    TRIG 110 12/12/2019        Lab Results   Component Value Date    GLUCOSE 90 12/12/2019   , No results found for: HGBA1C      Lab Results   Component Value Date    CREATININE 0.7 12/12/2019       Lab Results   Component Value Date    TSH 20.51 (H) 03/09/2020

## 2020-12-07 NOTE — TELEPHONE ENCOUNTER
Medicine Refill Request    Last Office Visit: 3/9/2020  Last Telemedicine Visit: 4/21/2020 Bandar Acharya MD    Next Office Visit: Visit date not found  Next Telemedicine Visit: Visit date not found     Needs appointment does she need meds before     Current Outpatient Medications:   •  albuterol HFA (PROAIR HFA) 90 mcg/actuation inhaler, Inhale 2 puffs every 4 (four) hours as needed for wheezing., Disp: 1 Inhaler, Rfl: 1  •  allopurinoL (ZYLOPRIM) 300 mg tablet, take 1 tablet by mouth once daily, Disp: 90 tablet, Rfl: 1  •  citalopram (celeXA) 20 mg tablet, take 1 tablet by mouth once daily, Disp: 90 tablet, Rfl: 1  •  conjugated estrogens (PREMARIN) 0.625 mg/gram vaginal cream, apply small pea sized amount to external vaginal/urethral area daily for 2 weeks then 2x/week, Disp: , Rfl:   •  diclofenac sodium (VOLTAREN) 1 % topical gel, apply 4 grams to affected area four times a day, Disp: , Rfl: 0  •  estradiol (ESTRACE) 0.01 % (0.1 mg/gram) vaginal cream, insert (1G)  by vaginal route 2x/week, Disp: , Rfl:   •  fexofenadine (ALLEGRA) 180 mg tablet, Take 180 mg by mouth daily., Disp: , Rfl:   •  fluticasone (FLONASE) 50 mcg/actuation nasal spray, Administer 1 spray into each nostril 2 (two) times a day., Disp: 1 Bottle, Rfl: 11  •  levothyroxine (SYNTHROID) 88 mcg tablet, take 1 tablet by mouth once daily, Disp: 30 tablet, Rfl: 1  •  magnesium oxide 400 mg capsule, 400 mg., Disp: , Rfl:   •  mupirocin (BACTROBAN) 2 % ointment, 2 %., Disp: , Rfl:   •  omeprazole (PriLOSEC) 40 mg capsule, Take 40 mg by mouth 2 (two) times a day., Disp: , Rfl:   •  pramipexole (MIRAPEX) 0.125 mg tablet, TAKE 1 TABLET AT NIGHT, Disp: 30 tablet, Rfl: 6  •  prochlorperazine (COMPAZINE) 5 mg tablet, take 1 tablet by mouth every 8 hours if needed for nausea and vomiting, Disp: 30 tablet, Rfl: 1  •  zoledronic acid (RECLAST) IVPB, Infuse 5 mg into a venous catheter once., Disp: , Rfl:       BP Readings from Last 3 Encounters:    03/09/20 100/62   01/09/20 120/64   12/12/19 102/60       Recent Lab results:  Lab Results   Component Value Date    CHOL 208 (H) 12/12/2019   ,   Lab Results   Component Value Date    HDL 68 12/12/2019   ,   Lab Results   Component Value Date    LDLCALC 118 (H) 12/12/2019   ,   Lab Results   Component Value Date    TRIG 110 12/12/2019        Lab Results   Component Value Date    GLUCOSE 90 12/12/2019   , No results found for: HGBA1C      Lab Results   Component Value Date    CREATININE 0.7 12/12/2019       Lab Results   Component Value Date    TSH 20.51 (H) 03/09/2020

## 2020-12-08 RX ORDER — ALLOPURINOL 300 MG/1
TABLET ORAL
Qty: 90 TABLET | Refills: 1 | Status: SHIPPED | OUTPATIENT
Start: 2020-12-08 | End: 2021-01-05

## 2020-12-08 NOTE — TELEPHONE ENCOUNTER
Pt is already scheduled for 12/16/2020 with Dr BRAXTON According to her chart she needs refill prior to appt

## 2020-12-08 NOTE — TELEPHONE ENCOUNTER
Medicine Refill Request    Last Office Visit: 3/9/2020  Last Telemedicine Visit: 4/21/2020 Bandar Acharya MD    Next Office Visit: 12/16/2020 - with Dr BLACKBURN  Next Telemedicine Visit: Visit date not found         Current Outpatient Medications:   •  albuterol HFA (PROAIR HFA) 90 mcg/actuation inhaler, Inhale 2 puffs every 4 (four) hours as needed for wheezing., Disp: 1 Inhaler, Rfl: 1  •  allopurinoL (ZYLOPRIM) 300 mg tablet, take 1 tablet by mouth once daily, Disp: 90 tablet, Rfl: 1  •  citalopram (celeXA) 20 mg tablet, take 1 tablet by mouth once daily, Disp: 90 tablet, Rfl: 1  •  conjugated estrogens (PREMARIN) 0.625 mg/gram vaginal cream, apply small pea sized amount to external vaginal/urethral area daily for 2 weeks then 2x/week, Disp: , Rfl:   •  diclofenac sodium (VOLTAREN) 1 % topical gel, apply 4 grams to affected area four times a day, Disp: , Rfl: 0  •  estradiol (ESTRACE) 0.01 % (0.1 mg/gram) vaginal cream, insert (1G)  by vaginal route 2x/week, Disp: , Rfl:   •  fexofenadine (ALLEGRA) 180 mg tablet, Take 180 mg by mouth daily., Disp: , Rfl:   •  fluticasone (FLONASE) 50 mcg/actuation nasal spray, Administer 1 spray into each nostril 2 (two) times a day., Disp: 1 Bottle, Rfl: 11  •  levothyroxine (SYNTHROID) 88 mcg tablet, take 1 tablet by mouth once daily, Disp: 30 tablet, Rfl: 1  •  magnesium oxide 400 mg capsule, 400 mg., Disp: , Rfl:   •  mupirocin (BACTROBAN) 2 % ointment, 2 %., Disp: , Rfl:   •  omeprazole (PriLOSEC) 40 mg capsule, Take 40 mg by mouth 2 (two) times a day., Disp: , Rfl:   •  pramipexole (MIRAPEX) 0.125 mg tablet, TAKE 1 TABLET AT NIGHT, Disp: 30 tablet, Rfl: 6  •  prochlorperazine (COMPAZINE) 5 mg tablet, take 1 tablet by mouth every 8 hours if needed for nausea and vomiting, Disp: 30 tablet, Rfl: 1  •  zoledronic acid (RECLAST) IVPB, Infuse 5 mg into a venous catheter once., Disp: , Rfl:       BP Readings from Last 3 Encounters:   03/09/20 100/62   01/09/20 120/64   12/12/19  102/60       Recent Lab results:  Lab Results   Component Value Date    CHOL 208 (H) 12/12/2019   ,   Lab Results   Component Value Date    HDL 68 12/12/2019   ,   Lab Results   Component Value Date    LDLCALC 118 (H) 12/12/2019   ,   Lab Results   Component Value Date    TRIG 110 12/12/2019        Lab Results   Component Value Date    GLUCOSE 90 12/12/2019   , No results found for: HGBA1C      Lab Results   Component Value Date    CREATININE 0.7 12/12/2019       Lab Results   Component Value Date    TSH 20.51 (H) 03/09/2020

## 2020-12-15 PROBLEM — E83.42 HYPOMAGNESEMIA: Status: RESOLVED | Noted: 2019-07-10 | Resolved: 2020-12-15

## 2020-12-15 PROBLEM — R11.0 NAUSEA: Status: RESOLVED | Noted: 2018-10-19 | Resolved: 2020-12-15

## 2020-12-15 PROBLEM — R74.8 ELEVATED LIVER ENZYMES: Status: RESOLVED | Noted: 2019-07-10 | Resolved: 2020-12-15

## 2020-12-15 PROBLEM — N28.9 NEPHROPATHY: Status: RESOLVED | Noted: 2019-10-10 | Resolved: 2020-12-15

## 2020-12-15 PROBLEM — E87.1 HYPONATREMIA: Status: RESOLVED | Noted: 2019-07-10 | Resolved: 2020-12-15

## 2020-12-16 ENCOUNTER — TELEPHONE (OUTPATIENT)
Dept: PRIMARY CARE | Facility: CLINIC | Age: 85
End: 2020-12-16

## 2020-12-16 DIAGNOSIS — E06.3 HYPOTHYROIDISM DUE TO HASHIMOTO'S THYROIDITIS: ICD-10-CM

## 2020-12-16 DIAGNOSIS — M10.9 GOUT, UNSPECIFIED CAUSE, UNSPECIFIED CHRONICITY, UNSPECIFIED SITE: ICD-10-CM

## 2020-12-16 DIAGNOSIS — N18.2 CHRONIC KIDNEY DISEASE, STAGE II (MILD): Primary | ICD-10-CM

## 2020-12-16 DIAGNOSIS — E78.5 HYPERLIPIDEMIA, UNSPECIFIED HYPERLIPIDEMIA TYPE: ICD-10-CM

## 2020-12-16 DIAGNOSIS — E55.9 VITAMIN D DEFICIENCY: ICD-10-CM

## 2020-12-16 DIAGNOSIS — M81.0 AGE-RELATED OSTEOPOROSIS WITHOUT CURRENT PATHOLOGICAL FRACTURE: ICD-10-CM

## 2020-12-16 NOTE — TELEPHONE ENCOUNTER
Jet called and canceled his mom's appointments that she was not comfortable coming out in the snow.  They rescheduled for 2 weeks from now and agreed to get blood work done prior which I sent to the lab.

## 2020-12-29 PROBLEM — M48.061 LUMBAR SPINAL STENOSIS: Status: ACTIVE | Noted: 2017-01-09

## 2020-12-29 PROBLEM — K44.9 LARGE HIATAL HERNIA: Status: ACTIVE | Noted: 2020-12-29

## 2020-12-30 ENCOUNTER — OFFICE VISIT (OUTPATIENT)
Dept: PRIMARY CARE | Facility: CLINIC | Age: 85
End: 2020-12-30
Payer: MEDICARE

## 2020-12-30 VITALS
TEMPERATURE: 97.6 F | RESPIRATION RATE: 18 BRPM | SYSTOLIC BLOOD PRESSURE: 130 MMHG | DIASTOLIC BLOOD PRESSURE: 78 MMHG | HEART RATE: 72 BPM | HEIGHT: 60 IN | WEIGHT: 95 LBS | BODY MASS INDEX: 18.65 KG/M2 | OXYGEN SATURATION: 96 %

## 2020-12-30 DIAGNOSIS — Z23 NEED FOR IMMUNIZATION AGAINST INFLUENZA: ICD-10-CM

## 2020-12-30 DIAGNOSIS — E55.9 VITAMIN D DEFICIENCY: ICD-10-CM

## 2020-12-30 DIAGNOSIS — R41.0 CONFUSION: ICD-10-CM

## 2020-12-30 DIAGNOSIS — M81.0 AGE-RELATED OSTEOPOROSIS WITHOUT CURRENT PATHOLOGICAL FRACTURE: ICD-10-CM

## 2020-12-30 DIAGNOSIS — F43.10 STRESS DISORDER, POST TRAUMATIC: ICD-10-CM

## 2020-12-30 DIAGNOSIS — Z02.89 ENCOUNTER FOR COMPLETION OF FORM WITH PATIENT: ICD-10-CM

## 2020-12-30 DIAGNOSIS — M15.9 OSTEOARTHRITIS INVOLVING MULTIPLE JOINTS ON BOTH SIDES OF BODY: ICD-10-CM

## 2020-12-30 DIAGNOSIS — F41.9 ANXIETY AND DEPRESSION: ICD-10-CM

## 2020-12-30 DIAGNOSIS — N18.2 CHRONIC KIDNEY DISEASE, STAGE II (MILD): ICD-10-CM
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
## 2020-12-30 DIAGNOSIS — S51.812A SKIN TEAR OF LEFT FOREARM WITHOUT COMPLICATION, INITIAL ENCOUNTER: ICD-10-CM

## 2020-12-30 DIAGNOSIS — E78.5 HYPERLIPIDEMIA, UNSPECIFIED HYPERLIPIDEMIA TYPE: ICD-10-CM

## 2020-12-30 DIAGNOSIS — E06.3 HYPOTHYROIDISM DUE TO HASHIMOTO'S THYROIDITIS: Primary | ICD-10-CM

## 2020-12-30 DIAGNOSIS — F32.A ANXIETY AND DEPRESSION: ICD-10-CM

## 2020-12-30 PROBLEM — J32.9 CHRONIC SINUSITIS: Status: RESOLVED | Noted: 2018-06-13 | Resolved: 2020-12-30

## 2020-12-30 LAB
BACTERIA URNS QL MICRO: ABNORMAL /HPF
BILIRUB UR QL STRIP.AUTO: NEGATIVE MG/DL
CLARITY UR REFRACT.AUTO: CLEAR
COLOR UR AUTO: YELLOW
GLUCOSE UR STRIP.AUTO-MCNC: NEGATIVE MG/DL
HGB UR QL STRIP.AUTO: NEGATIVE
HYALINE CASTS #/AREA URNS LPF: ABNORMAL /LPF
KETONES UR STRIP.AUTO-MCNC: NEGATIVE MG/DL
LEUKOCYTE ESTERASE UR QL STRIP.AUTO: NEGATIVE
NITRITE UR QL STRIP.AUTO: NEGATIVE
PH UR STRIP.AUTO: 6.5 [PH]
PROT UR QL STRIP.AUTO: 2
RBC #/AREA URNS HPF: ABNORMAL /HPF
SP GR UR REFRACT.AUTO: 1.02
SQUAMOUS URNS QL MICRO: 1 /HPF
UROBILINOGEN UR STRIP-ACNC: 0.2 EU/DL
WBC #/AREA URNS HPF: ABNORMAL /HPF

## 2020-12-30 PROCEDURE — 99214 OFFICE O/P EST MOD 30 MIN: CPT | Mod: 25 | Performed by: FAMILY MEDICINE

## 2020-12-30 PROCEDURE — 81001 URINALYSIS AUTO W/SCOPE: CPT | Performed by: FAMILY MEDICINE

## 2020-12-30 PROCEDURE — G0008 ADMIN INFLUENZA VIRUS VAC: HCPCS | Performed by: FAMILY MEDICINE

## 2020-12-30 PROCEDURE — 90694 VACC AIIV4 NO PRSRV 0.5ML IM: CPT | Performed by: FAMILY MEDICINE

## 2020-12-30 PROCEDURE — 87086 URINE CULTURE/COLONY COUNT: CPT | Performed by: FAMILY MEDICINE

## 2020-12-30 RX ORDER — DULOXETIN HYDROCHLORIDE 20 MG/1
CAPSULE, DELAYED RELEASE ORAL
COMMUNITY
Start: 2020-12-09 | End: 2021-01-05

## 2020-12-30 RX ORDER — LANOLIN ALCOHOL/MO/W.PET/CERES
1000 CREAM (GRAM) TOPICAL DAILY
COMMUNITY

## 2020-12-30 RX ORDER — CHOLECALCIFEROL (VITAMIN D3) 25 MCG
1000 TABLET ORAL DAILY
COMMUNITY

## 2020-12-30 ASSESSMENT — ENCOUNTER SYMPTOMS
CHEST TIGHTNESS: 0
FACIAL ASYMMETRY: 0
FEVER: 0
SPEECH DIFFICULTY: 0
DYSPHORIC MOOD: 1
DIZZINESS: 0
APPETITE CHANGE: 0
NUMBNESS: 0
WEAKNESS: 0
UNEXPECTED WEIGHT CHANGE: 0
HEADACHES: 0
CHILLS: 0
DIAPHORESIS: 0
SLEEP DISTURBANCE: 1
LIGHT-HEADEDNESS: 0
ABDOMINAL PAIN: 0
SHORTNESS OF BREATH: 0

## 2020-12-30 NOTE — ASSESSMENT & PLAN NOTE
-Since the pandemic has led to more social isolation, her son has noted she will intermittently become somewhat confused, but this is rare and stable  -He actually thinks it is better over the past week  -Check full panel of labs as previously ordered and also assess UA to rule out any underlying UTI  -She is lucid today and clear of mind with no confusion noted.  Answering all questions appropriately.

## 2020-12-30 NOTE — ASSESSMENT & PLAN NOTE
-I offered referral for PMNR to discuss treatment options for her chronic pain, but they declined at this time

## 2020-12-30 NOTE — PROGRESS NOTES
Subjective      Patient ID: Lida Foley is a 85 y.o. female.  1935      Lida is here with her son, Jet, to transition care to me and also to discuss transitioning into nursing home care    Anxiety/depression  -Of note, based on last PCP note from 4/2020, duloxetine was recommended to be increased to 60 mg due to increased anxiety and multijoint OA pain (but she never did this  -Upon med review, we see that she is taking both Citalopram and Duloxetine (through orthopedist).   -Pt denies and Jet denies that she is anxious or stressed  -Issues with sleep, but not from anxiety- strickly related to chronic back and shoulder pain    Mechanical Fall  -6 days ago, at the top of the steps, she had a misstep and fell backwards.  -Luckily, her son caught her but she scraped the back of her head against the railing and her left forearm against the floor  -Since then, the forearm and head are healing well  -There is no loss of consciousness  -No proceeding dyspnea, chest pain, or dizziness/lightheadedness  -balance continues to be an issue- does well with rolling walker. No resulting worsened balance, unilateral weakness/paresthesia, or headache  -looking for placement at [facility] given slow decline over time    Hypothyroidism  -Levothyroxine was increased to 88 mcg on 3/10/2020, but repeat labs were never done  -I actually ordered labs for them 2 weeks ago, but they never had these done  -She feels chronically fatigued, but cannot state when this started    CKD  -Was following with nephrologist, Dr. Osman Wraner, but not recently      The following have been reviewed and updated as appropriate in this visit:  Tobacco  Problems  Med Hx  Surg Hx  Fam Hx       Review of Systems   Constitutional: Negative for appetite change, chills, diaphoresis, fever and unexpected weight change.   Respiratory: Negative for chest tightness and shortness of breath.    Cardiovascular: Negative for chest pain and leg  swelling.   Gastrointestinal: Negative for abdominal pain.        Persistent gerd sx at baseline   Neurological: Negative for dizziness, syncope, facial asymmetry, speech difficulty, weakness, light-headedness, numbness and headaches.   Psychiatric/Behavioral: Positive for dysphoric mood (sad that her daughters do not call) and sleep disturbance (2/2 pain specifically).       Objective     Vitals:    12/30/20 1501 12/30/20 1506   BP:  130/78   BP Location:  Left upper arm   Patient Position:  Sitting   Pulse:  72   Resp:  18   Temp:  36.4 °C (97.6 °F)   SpO2:  96%   Weight:  43.1 kg (95 lb)   Height: 1.524 m (5') 1.524 m (5')     Body mass index is 18.55 kg/m².    Physical Exam  Constitutional:       General: She is not in acute distress.     Appearance: Normal appearance. She is normal weight. She is not ill-appearing, toxic-appearing or diaphoretic.      Comments: Thin appearance at baseline   HENT:      Head: Normocephalic and atraumatic.      Mouth/Throat:      Mouth: Mucous membranes are moist.      Pharynx: Oropharynx is clear.   Eyes:      General: No scleral icterus.     Extraocular Movements: Extraocular movements intact.      Pupils: Pupils are equal, round, and reactive to light.   Neck:      Thyroid: No thyroid mass, thyromegaly or thyroid tenderness.   Cardiovascular:      Rate and Rhythm: Normal rate and regular rhythm.      Heart sounds: Normal heart sounds. No murmur. No friction rub. No gallop.    Pulmonary:      Effort: Pulmonary effort is normal. No respiratory distress.      Breath sounds: Normal breath sounds. No stridor. No wheezing, rhonchi or rales.   Abdominal:      General: Abdomen is flat. Bowel sounds are normal. There is no distension.      Palpations: Abdomen is soft. There is no mass.      Tenderness: There is no abdominal tenderness. There is no guarding or rebound.      Hernia: No hernia is present.   Musculoskeletal:      Right lower leg: No edema.      Left lower leg: No edema.       Comments: 5/5 strength of upper extremities equally and symmetrically   Lymphadenopathy:      Cervical: No cervical adenopathy.   Skin:     Comments: Well healed skin tear over L medial forearm. Scabbed abrasion over top of scalp   Neurological:      Mental Status: She is alert and oriented to person, place, and time. Mental status is at baseline.      Cranial Nerves: No cranial nerve deficit.   Psychiatric:         Mood and Affect: Mood normal.         Behavior: Behavior normal.         Thought Content: Thought content normal.      Comments: Very pleasant, upbeat, and conversational.  No apparent anxiety or depression         Assessment/Plan   Diagnoses and all orders for this visit:    Hypothyroidism due to Hashimoto's thyroiditis (Primary)  Assessment & Plan:  -TSH was never trended after up titration of levothyroxine  -Trend tomorrow as planned with her labs  -She is essentially clinically euthyroid at this time      Chronic kidney disease, stage II (mild)  Assessment & Plan:  -Encourage adequate hydration and avoidance of NSAIDs (is allergic anyway)  -Trend labs tomorrow as ordered    Orders:  -     Urinalysis with microscopic  -     UA Macroscopic  -     UA Microscopic    Anxiety and depression  Assessment & Plan:  -Upon medication review, I saw that she was concurrently taking duloxetine and citalopram  -I would prefer for her not to be taking SNRI and SSRI simultaneously  -Since the duloxetine can also provide some benefit from a chronic pain perspective, I will wean her off the citalopram by having her take 10 mg daily for 1 week and then 10 mg every other day for 1 week and then stopping  -If her anxiety resurfaces her mood worsens (both are essentially stable currently), then we will increase the duloxetine to 60 mg      Stress disorder, post traumatic    Hyperlipidemia, unspecified hyperlipidemia type    Vitamin D deficiency    Fall, initial encounter  Assessment & Plan:  -Purely mechanical  -No major  head trauma or LOC to warrant CT of head  -Neurologic exam intact today  -Offered PT for balance training and fall prevention, but they declined this at this time      Confusion  Assessment & Plan:  -Since the pandemic has led to more social isolation, her son has noted she will intermittently become somewhat confused, but this is rare and stable  -He actually thinks it is better over the past week  -Check full panel of labs as previously ordered and also assess UA to rule out any underlying UTI  -She is lucid today and clear of mind with no confusion noted.  Answering all questions appropriately.    Orders:  -     Urine culture    Skin tear of left forearm without complication, initial encounter  Comments:  -Healing well with no signs of infection-continue to monitor    Age-related osteoporosis without current pathological fracture  Assessment & Plan:  -Ensure adequate vitamin D level  -They have made the decision to stop Reclast previously and do not want any other treatment options at this time      Osteoarthritis involving multiple joints on both sides of body  Assessment & Plan:  -I offered referral for PMNR to discuss treatment options for her chronic pain, but they declined at this time      Encounter for completion of form with patient  Assessment & Plan:  -Lida is planning to be transitioned to residential nursing facility, which I think will be great for her socially and physically, as she can also partake in balance training/fall prevention classes there and PT if needed  -When they decide on the facility, I would be glad to complete the paperwork      Need for immunization against influenza    Other orders  -     Influenza vaccine Quad Adjuvanted 65 and Older (FluAd Quad)

## 2020-12-30 NOTE — ASSESSMENT & PLAN NOTE
-Upon medication review, I saw that she was concurrently taking duloxetine and citalopram  -I would prefer for her not to be taking SNRI and SSRI simultaneously  -Since the duloxetine can also provide some benefit from a chronic pain perspective, I will wean her off the citalopram by having her take 10 mg daily for 1 week and then 10 mg every other day for 1 week and then stopping  -If her anxiety resurfaces her mood worsens (both are essentially stable currently), then we will increase the duloxetine to 60 mg

## 2020-12-30 NOTE — ASSESSMENT & PLAN NOTE
-Ensure adequate vitamin D level  -They have made the decision to stop Reclast previously and do not want any other treatment options at this time

## 2020-12-30 NOTE — ASSESSMENT & PLAN NOTE
-Lida is planning to be transitioned to residential nursing facility, which I think will be great for her socially and physically, as she can also partake in balance training/fall prevention classes there and PT if needed  -When they decide on the facility, I would be glad to complete the paperwork   Never smoker

## 2020-12-30 NOTE — ASSESSMENT & PLAN NOTE
-Encourage adequate hydration and avoidance of NSAIDs (is allergic anyway)  -Trend labs tomorrow as ordered

## 2020-12-30 NOTE — ASSESSMENT & PLAN NOTE
-Purely mechanical  -No major head trauma or LOC to warrant CT of head  -Neurologic exam intact today  -Offered PT for balance training and fall prevention, but they declined this at this time

## 2020-12-30 NOTE — PATIENT INSTRUCTIONS
Please reduce the citalopram to half a pill daily for the next week and then take half a pill every other day for 1 week and then stop.  Let me know if anxiety or depression worsens with this change and if it does we will increase the duloxetine dosage.  If it stays stable, we do not need to make any changes.  Please have fasting labs done tomorrow.    Continue to use the walker.

## 2020-12-30 NOTE — ASSESSMENT & PLAN NOTE
-TSH was never trended after up titration of levothyroxine  -Trend tomorrow as planned with her labs  -She is essentially clinically euthyroid at this time

## 2020-12-31 PROBLEM — R80.1 PERSISTENT PROTEINURIA: Status: ACTIVE | Noted: 2020-12-31

## 2021-01-01 ENCOUNTER — TELEPHONE (OUTPATIENT)
Dept: PRIMARY CARE | Facility: CLINIC | Age: 86
End: 2021-01-01

## 2021-01-01 DIAGNOSIS — R41.0 CONFUSION: Primary | ICD-10-CM

## 2021-01-01 LAB
BACTERIA UR CULT: NORMAL
BACTERIA UR CULT: NORMAL

## 2021-01-01 NOTE — TELEPHONE ENCOUNTER
Please call and let son, Jet, know that urine contaminated and I would like them to leave another sample when they go for lab draw at [facility] lab at [facility]. Order placed and they should try to get a clean catch

## 2021-01-04 ENCOUNTER — APPOINTMENT (OUTPATIENT)
Dept: LAB | Facility: CLINIC | Age: 86
End: 2021-01-04
Attending: FAMILY MEDICINE
Payer: MEDICARE

## 2021-01-04 DIAGNOSIS — E55.9 VITAMIN D DEFICIENCY: ICD-10-CM

## 2021-01-04 DIAGNOSIS — N18.2 CHRONIC KIDNEY DISEASE, STAGE II (MILD): ICD-10-CM

## 2021-01-04 DIAGNOSIS — E78.5 HYPERLIPIDEMIA, UNSPECIFIED HYPERLIPIDEMIA TYPE: ICD-10-CM

## 2021-01-04 DIAGNOSIS — M10.9 GOUT, UNSPECIFIED CAUSE, UNSPECIFIED CHRONICITY, UNSPECIFIED SITE: ICD-10-CM

## 2021-01-04 DIAGNOSIS — E06.3 HYPOTHYROIDISM DUE TO HASHIMOTO'S THYROIDITIS: ICD-10-CM

## 2021-01-04 LAB
25(OH)D3 SERPL-MCNC: 38 NG/ML (ref 30–100)
ALBUMIN SERPL-MCNC: 4 G/DL (ref 3.4–5)
ALP SERPL-CCNC: 63 IU/L (ref 35–126)
ALT SERPL-CCNC: 29 IU/L (ref 11–54)
ANION GAP SERPL CALC-SCNC: 13 MEQ/L (ref 3–15)
AST SERPL-CCNC: 38 IU/L (ref 15–41)
BASOPHILS # BLD: 0.03 K/UL (ref 0.01–0.1)
BASOPHILS NFR BLD: 0.5 %
BILIRUB SERPL-MCNC: 0.5 MG/DL (ref 0.3–1.2)
BUN SERPL-MCNC: 13 MG/DL (ref 8–20)
CALCIUM SERPL-MCNC: 9.8 MG/DL (ref 8.9–10.3)
CHLORIDE SERPL-SCNC: 102 MEQ/L (ref 98–109)
CHOLEST SERPL-MCNC: 218 MG/DL
CO2 SERPL-SCNC: 27 MEQ/L (ref 22–32)
CREAT SERPL-MCNC: 0.7 MG/DL (ref 0.6–1.1)
DIFFERENTIAL METHOD BLD: ABNORMAL
EOSINOPHIL # BLD: 0.15 K/UL (ref 0.04–0.36)
EOSINOPHIL NFR BLD: 2.5 %
ERYTHROCYTE [DISTWIDTH] IN BLOOD BY AUTOMATED COUNT: 13.2 % (ref 11.7–14.4)
GFR SERPL CREATININE-BSD FRML MDRD: >60 ML/MIN/1.73M*2
GLUCOSE SERPL-MCNC: 88 MG/DL (ref 70–99)
HCT VFR BLDCO AUTO: 41.1 % (ref 35–45)
HDLC SERPL-MCNC: 88 MG/DL
HDLC SERPL: 2.5 {RATIO}
HGB BLD-MCNC: 13.3 G/DL (ref 11.8–15.7)
IMM GRANULOCYTES # BLD AUTO: 0.01 K/UL (ref 0–0.08)
IMM GRANULOCYTES NFR BLD AUTO: 0.2 %
LDLC SERPL CALC-MCNC: 114 MG/DL
LYMPHOCYTES # BLD: 1.85 K/UL (ref 1.2–3.5)
LYMPHOCYTES NFR BLD: 30.7 %
MCH RBC QN AUTO: 31.7 PG (ref 28–33.2)
MCHC RBC AUTO-ENTMCNC: 32.4 G/DL (ref 32.2–35.5)
MCV RBC AUTO: 98.1 FL (ref 83–98)
MONOCYTES # BLD: 0.42 K/UL (ref 0.28–0.8)
MONOCYTES NFR BLD: 7 %
NEUTROPHILS # BLD: 3.56 K/UL (ref 1.7–7)
NEUTS SEG NFR BLD: 59.1 %
NONHDLC SERPL-MCNC: 130 MG/DL
NRBC BLD-RTO: 0 %
PDW BLD AUTO: 9.8 FL (ref 9.4–12.3)
PLATELET # BLD AUTO: 302 K/UL (ref 150–369)
POTASSIUM SERPL-SCNC: 4.1 MEQ/L (ref 3.6–5.1)
PROT SERPL-MCNC: 6.7 G/DL (ref 6–8.2)
RBC # BLD AUTO: 4.19 M/UL (ref 3.93–5.22)
SODIUM SERPL-SCNC: 142 MEQ/L (ref 136–144)
TRIGL SERPL-MCNC: 80 MG/DL (ref 30–149)
TSH SERPL DL<=0.05 MIU/L-ACNC: 2.45 MIU/L (ref 0.34–5.6)
URATE SERPL-MCNC: 2.8 MG/DL (ref 2.6–8)
WBC # BLD AUTO: 6.02 K/UL (ref 3.8–10.5)

## 2021-01-04 PROCEDURE — 87086 URINE CULTURE/COLONY COUNT: CPT | Performed by: FAMILY MEDICINE

## 2021-01-04 PROCEDURE — 80061 LIPID PANEL: CPT

## 2021-01-04 PROCEDURE — 82306 VITAMIN D 25 HYDROXY: CPT

## 2021-01-04 PROCEDURE — 84443 ASSAY THYROID STIM HORMONE: CPT

## 2021-01-04 PROCEDURE — 36415 COLL VENOUS BLD VENIPUNCTURE: CPT

## 2021-01-04 PROCEDURE — 85025 COMPLETE CBC W/AUTO DIFF WBC: CPT

## 2021-01-04 PROCEDURE — 84550 ASSAY OF BLOOD/URIC ACID: CPT

## 2021-01-04 PROCEDURE — 80053 COMPREHEN METABOLIC PANEL: CPT

## 2021-01-04 NOTE — TELEPHONE ENCOUNTER
Spoke with son Jet. He is taking mom to lab this AM. Will try to get urine specimen there if not able will stop her on the way back and  wipes and specimen container.   Explained to Jet what a clean catch urine is and he verbalized understanding

## 2021-01-05 ENCOUNTER — TELEPHONE (OUTPATIENT)
Dept: PRIMARY CARE | Facility: CLINIC | Age: 86
End: 2021-01-05

## 2021-01-05 RX ORDER — DULOXETIN HYDROCHLORIDE 60 MG/1
60 CAPSULE, DELAYED RELEASE ORAL DAILY
Qty: 30 CAPSULE | Refills: 1 | Status: SHIPPED | OUTPATIENT
Start: 2021-01-05 | End: 2021-04-21

## 2021-01-05 RX ORDER — ALLOPURINOL 300 MG/1
150 TABLET ORAL DAILY
COMMUNITY
End: 2021-10-26

## 2021-01-05 NOTE — TELEPHONE ENCOUNTER
Spoke with son, Jet, regarding lab results  -TSH WNL-continue current dosage of levothyroxine  -Uric acid low <6-reduce allopurinol to half tablet daily and repeat uric acid with next labs  -LDL mildly elevated-reduce consumption of sweets and fatty foods, which she was eating liberally over the last few weeks.  Repeat in 1 year and hold off on statin now given her intermittent memory issues  -Vitamin D appropriate-continue current dosage of supplement  -Doing well with wean from citalopram with no residual anxiety or depression.  However, she still continues to deal with musculoskeletal pain chronically, so we will increase duloxetine now to 60 mg and follow-up with me in 1 month to assess effectiveness with warning signs for worsening anxiety or mood in the meantime    -It is certainly possible that her intermittent confusion could be due to pseudodementia from pain and anxiety, but I recommended neurology referral for further evaluation, but he declines for now  -He will let me know if any mentation changes leading up to next visit immediately and warning signs for ER reviewed  -Reassuring that her repeat urine was negative for infection and no evidence of any underlying organic etiology

## 2021-02-12 ENCOUNTER — TELEPHONE (OUTPATIENT)
Dept: PRIMARY CARE | Facility: CLINIC | Age: 86
End: 2021-02-12

## 2021-03-01 ENCOUNTER — TELEPHONE (OUTPATIENT)
Dept: PRIMARY CARE | Facility: CLINIC | Age: 86
End: 2021-03-01

## 2021-03-01 NOTE — TELEPHONE ENCOUNTER
Patients son dropped off a form for his Mom to be filled out by Dr. ALEXEY EDMONDS gave to the nurse

## 2021-03-01 NOTE — TELEPHONE ENCOUNTER
Yamilka gave me patients form, I initialed and placed in Dr. BLACKBURN folder and placed on Dr. BLACKBURN desk.

## 2021-03-03 ENCOUNTER — TELEPHONE (OUTPATIENT)
Dept: PRIMARY CARE | Facility: CLINIC | Age: 86
End: 2021-03-03

## 2021-03-03 NOTE — TELEPHONE ENCOUNTER
Please call and inform patient that Medical Evaluation form is completed by Dr. Almanza with NO CHARGE. Patient can come by office and pick it up. I placed form in Dr Almanza BLUE FOLDER at Medical Assistant station.

## 2021-03-10 ENCOUNTER — TELEPHONE (OUTPATIENT)
Dept: PRIMARY CARE | Facility: CLINIC | Age: 86
End: 2021-03-10

## 2021-03-13 ENCOUNTER — IMMUNIZATION (OUTPATIENT)
Dept: IMMUNIZATION | Facility: CLINIC | Age: 86
End: 2021-03-13

## 2021-03-19 ENCOUNTER — TELEPHONE (OUTPATIENT)
Dept: PRIMARY CARE | Facility: CLINIC | Age: 86
End: 2021-03-19

## 2021-04-07 ENCOUNTER — OFFICE VISIT (OUTPATIENT)
Dept: PRIMARY CARE | Facility: CLINIC | Age: 86
End: 2021-04-07
Payer: MEDICARE

## 2021-04-07 ENCOUNTER — DOCUMENTATION (OUTPATIENT)
Dept: PRIMARY CARE | Facility: CLINIC | Age: 86
End: 2021-04-07

## 2021-04-07 VITALS
RESPIRATION RATE: 18 BRPM | WEIGHT: 94.2 LBS | HEIGHT: 61 IN | BODY MASS INDEX: 17.79 KG/M2 | DIASTOLIC BLOOD PRESSURE: 60 MMHG | OXYGEN SATURATION: 97 % | SYSTOLIC BLOOD PRESSURE: 120 MMHG | TEMPERATURE: 98.1 F | HEART RATE: 78 BPM

## 2021-04-07 DIAGNOSIS — J30.9 ALLERGIC RHINITIS WITH POSTNASAL DRIP: ICD-10-CM

## 2021-04-07 DIAGNOSIS — L89.321 PRESSURE INJURY OF LEFT BUTTOCK, STAGE 1: Primary | ICD-10-CM

## 2021-04-07 DIAGNOSIS — K21.9 GERD WITHOUT ESOPHAGITIS: ICD-10-CM

## 2021-04-07 DIAGNOSIS — F32.A ANXIETY AND DEPRESSION: ICD-10-CM

## 2021-04-07 DIAGNOSIS — K31.84 GASTROPARESIS: ICD-10-CM

## 2021-04-07 DIAGNOSIS — F41.9 ANXIETY AND DEPRESSION: ICD-10-CM

## 2021-04-07 DIAGNOSIS — B37.0 ORAL THRUSH: ICD-10-CM

## 2021-04-07 DIAGNOSIS — R09.82 ALLERGIC RHINITIS WITH POSTNASAL DRIP: ICD-10-CM

## 2021-04-07 PROBLEM — F33.9 MAJOR DEPRESSIVE DISORDER, RECURRENT, UNSPECIFIED: Status: ACTIVE | Noted: 2021-04-07

## 2021-04-07 PROCEDURE — 99214 OFFICE O/P EST MOD 30 MIN: CPT | Performed by: FAMILY MEDICINE

## 2021-04-07 RX ORDER — CLOTRIMAZOLE 10 MG/1
10 LOZENGE ORAL
Qty: 50 TABLET | Refills: 0 | Status: SHIPPED | OUTPATIENT
Start: 2021-04-07 | End: 2021-04-17

## 2021-04-07 ASSESSMENT — ENCOUNTER SYMPTOMS
NAUSEA: 0
FEVER: 0
WOUND: 1
VOMITING: 0
ROS GI COMMENTS: NO BLACK STOOLS
APPETITE CHANGE: 1
ABDOMINAL PAIN: 0
TROUBLE SWALLOWING: 1
SINUS PRESSURE: 0
DYSPHORIC MOOD: 0
BLOOD IN STOOL: 0
UNEXPECTED WEIGHT CHANGE: 0
ANAL BLEEDING: 0
NERVOUS/ANXIOUS: 0
SINUS PAIN: 0
CHILLS: 0

## 2021-04-07 NOTE — PATIENT INSTRUCTIONS
Please apply Desitin or A&D ointment 3-4 times per day over the affected area and try to offload the area by having her frequently change her sitting position.  If any increased pain, spreading redness, warmth, or discharge, please come in to see me right away.  Otherwise, let me know if the area is not resolved in 10 days.    I suspect that the thrush is causing the irritation of your mouth, so please use the clotrimazole dissolvable troches on the tongue 5 times per day for the next 10 days and let me know if not resolved thereafter.  If not much better in 1 week, I will need to coordinate with her GI to discuss potentially treating an esophageal yeast infection.  However, let me know right away if developing any worsened swallowing or vomiting.  Add over-the-counter Allegra once daily to see if this helps with some of the postnasal drip as well.

## 2021-04-07 NOTE — ASSESSMENT & PLAN NOTE
-Lida feels that her GERD symptoms have worsened over the past several weeks with the main complaint being a sensation of phlegm in her throat  -suspect combo of persistent chronic gerd, pnd, AND increased saliva production from oral thrush/dryness  -She does admit that she also has some allergy related postnasal drip and does not take any medication for this-she wants to avoid nasal sprays, so I have added Allegra once daily OTC  -She is on maximum strength omeprazole twice daily, so she will continue this and will be mindful to avoid lying flat for 2 hours after eating  -She will try to eat smaller more frequent meals and avoid any particular foods that flare her acid reflux  -Although she does have some issues with swallowing, this is not a new symptom, but in the setting of oral thrush, if her symptoms persist after clotrimazole treatment as below, I will need to coordinate with her GI to determine if she is a candidate for oral antifungal to empirically treat for yeast esophagitis  -Obviously though, if she develops any worsening swallowing or vomiting in the meantime, they will contact me right away    -Hesitant to prescribe fluconazole today since her QTc interval was slightly elevated on her EKG from 2017

## 2021-04-07 NOTE — PROGRESS NOTES
Coding Clarification (ML) - [facility]  Note       DATE: 2021    RE: Lida Og  : 1935      Under the direction of Jayme Almanza Jr, DO, the following adjustments were made to this patients Problem List:      New Code: F33.9            Code Description: Major depressive disorder, recurrent, unspecified  Clarification Type EMR System Encounter Type Date of Service Provider   New Code Epic Progress Notes 2019 Bandar Acharya MD   Rationale: Depression found in PMH with Citalopram from provider

## 2021-04-07 NOTE — PROGRESS NOTES
"      Subjective      Patient ID: Lida Foley is a 85 y.o. female.  1935      Ana is here with her son, Jet, for a wound   -Has been a recurrent issue intermittently over time, but more noticeable over the past week  -Over the tailbone  -Roll area that they have treated with Neosporin intermittently and it seems to be slightly improved  -There is no pus, streaking redness, or fever/chills    GERD  -Continues to be a recurrent issue for Lida  -She does have a hx of a large hiatal hernia and gastroparesis per our records, but has not seen Dr. Arredondo in quite some time  -She is on Prilosec 40 mg twice daily, but still notices ascending acid, upper esophageal dysphagia with swallowing, and early satiety   -she denies any vomiting      The following have been reviewed and updated as appropriate in this visit:  Tobacco  Allergies  Meds  Problems  Med Hx  Surg Hx  Fam Hx       Review of Systems   Constitutional: Positive for appetite change (Jet reports decreased po intake compared to a few mos ago). Negative for chills, fever and unexpected weight change (down slightly ).   HENT: Positive for postnasal drip (over the past mo since weather changed) and trouble swallowing. Negative for sinus pressure and sinus pain.    Gastrointestinal: Negative for abdominal pain, anal bleeding, blood in stool, nausea and vomiting.        No black stools   Skin: Positive for wound (see hpi).   Psychiatric/Behavioral: Negative for dysphoric mood (feeling improved). The patient is not nervous/anxious (better controlled, but flared with the recent sensation of phlegm in throat).        Objective     Vitals:    04/07/21 1357   BP: 120/60   BP Location: Left upper arm   Patient Position: Sitting   Pulse: 78   Resp: 18   Temp: 36.7 °C (98.1 °F)   TempSrc: Oral   SpO2: 97%   Weight: 42.7 kg (94 lb 3.2 oz)   Height: 1.549 m (5' 1\")     Body mass index is 17.8 kg/m².    Physical Exam  Constitutional:       General: She is not in acute " distress.     Appearance: Normal appearance. She is not ill-appearing, toxic-appearing or diaphoretic.   HENT:      Head: Normocephalic and atraumatic.      Nose:      Comments: Actually no nasal turbinate hypertrophy.     Mouth/Throat:      Mouth: Mucous membranes are moist.      Pharynx: Oropharynx is clear.      Comments: Her tongue has a white coating and there is slight dryness of her mucous membranes.  There are no fungal satellite lesions noted over her palate, posterior pharynx, or base of tongue that I can see.  She is swallowing frequently during the visit.  Eyes:      General: No scleral icterus.  Cardiovascular:      Rate and Rhythm: Normal rate and regular rhythm.      Heart sounds: Normal heart sounds. No murmur. No friction rub. No gallop.    Pulmonary:      Effort: Pulmonary effort is normal. No respiratory distress.      Breath sounds: Normal breath sounds. No stridor. No wheezing, rhonchi or rales.   Abdominal:      General: Abdomen is flat. Bowel sounds are normal. There is no distension.      Palpations: Abdomen is soft. There is no mass.      Tenderness: There is no abdominal tenderness. There is no guarding or rebound.      Hernia: No hernia is present.   Musculoskeletal:      Right lower leg: No edema.      Left lower leg: No edema.   Skin:     Comments: Over the left superficial medial gluteal skin, there is a 1 to 2 cm scab present with a 1 mm area of erythema surrounding, but no warmth, soft tissue swelling, deep tenderness to palpation, or bony tenderness over the sacrum or ischial tuberosity.  This is more so over the fleshy skin of the buttock than over a bony protuberance.   Neurological:      Mental Status: She is alert and oriented to person, place, and time. Mental status is at baseline.         Assessment/Plan   Diagnoses and all orders for this visit:    Pressure injury of left buttock, stage 1 (Primary)  Assessment & Plan:  -Very superficial, scabbed lesion that is no longer  ulcerated  -Her son did a good job caring for this and it is already improving  -Therefore, no need for any further topical antibiotics, they will stop the Neosporin and rather use a barrier cream, like Desitin or A&D ointment 3-4 times per day with frequent repositioning and taking time to stand to offload the area  -If developing any symptoms of cellulitis, abscess, or stage II ulceration, they will let me know right away-see instructions      Anxiety and depression  Assessment & Plan:  -Doing reasonably well on current dosage of duloxetine and actually complaining of her chronic pain much less!-Continue 60 mg duloxetine dosage      GERD without esophagitis  Assessment & Plan:  -Lida feels that her GERD symptoms have worsened over the past several weeks with the main complaint being a sensation of phlegm in her throat  -suspect combo of persistent chronic gerd, pnd, AND increased saliva production from oral thrush/dryness  -She does admit that she also has some allergy related postnasal drip and does not take any medication for this-she wants to avoid nasal sprays, so I have added Allegra once daily OTC  -She is on maximum strength omeprazole twice daily, so she will continue this and will be mindful to avoid lying flat for 2 hours after eating  -She will try to eat smaller more frequent meals and avoid any particular foods that flare her acid reflux  -Although she does have some issues with swallowing, this is not a new symptom, but in the setting of oral thrush, if her symptoms persist after clotrimazole treatment as below, I will need to coordinate with her GI to determine if she is a candidate for oral antifungal to empirically treat for yeast esophagitis  -Obviously though, if she develops any worsening swallowing or vomiting in the meantime, they will contact me right away    -Hesitant to prescribe fluconazole today since her QTc interval was slightly elevated on her EKG from  2017      Gastroparesis    Allergic rhinitis with postnasal drip    Oral thrush  Comments:  Clotrimazole troches 5x/day x10 days- NOT to swallow    Other orders  -     clotrimazole (MYCELEX) 10 mg lucero; Take 1 tablet (10 mg total) by mouth 5 (five) times a day for 10 days.

## 2021-04-07 NOTE — ASSESSMENT & PLAN NOTE
-Doing reasonably well on current dosage of duloxetine and actually complaining of her chronic pain much less!-Continue 60 mg duloxetine dosage

## 2021-04-07 NOTE — ASSESSMENT & PLAN NOTE
-Very superficial, scabbed lesion that is no longer ulcerated  -Her son did a good job caring for this and it is already improving  -Therefore, no need for any further topical antibiotics, they will stop the Neosporin and rather use a barrier cream, like Desitin or A&D ointment 3-4 times per day with frequent repositioning and taking time to stand to offload the area  -If developing any symptoms of cellulitis, abscess, or stage II ulceration, they will let me know right away-see instructions

## 2021-04-21 ENCOUNTER — IMMUNIZATION (OUTPATIENT)
Dept: IMMUNIZATION | Facility: CLINIC | Age: 86
End: 2021-04-21
Attending: FAMILY MEDICINE

## 2021-04-21 ENCOUNTER — TELEPHONE (OUTPATIENT)
Dept: PRIMARY CARE | Facility: CLINIC | Age: 86
End: 2021-04-21

## 2021-04-21 RX ORDER — DULOXETIN HYDROCHLORIDE 20 MG/1
20 CAPSULE, DELAYED RELEASE ORAL 2 TIMES DAILY
Qty: 180 CAPSULE | Refills: 1 | Status: SHIPPED | OUTPATIENT
Start: 2021-04-21 | End: 2021-10-08

## 2021-04-21 NOTE — TELEPHONE ENCOUNTER
S/w son, Jet, to check in since he never called me with an update last week as instructed.   - He reports her sx have completely resolved and that she is doing well.   - no longer with any oral or swallowing sx at all.     -She had been stating she was tired over the past month or 2 on the duloxetine 60 mg, so he reduced this to 2 separate 20 mg pills (total 40 mg/day) and reports that she is doing much better and he would like to keep her on this, so I adjusted the dosage.  If she develops any recurrent depression, heightened anxiety, or worsened musculoskeletal pain, he will let me know and we will increase back to 60 mg  -He is happy with the plan

## 2021-08-24 PROBLEM — S51.812A SKIN TEAR OF LEFT FOREARM WITHOUT COMPLICATION: Status: RESOLVED | Noted: 2020-12-30 | Resolved: 2021-08-24
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
## 2021-08-31 ENCOUNTER — OFFICE VISIT (OUTPATIENT)
Dept: PRIMARY CARE | Facility: CLINIC | Age: 86
End: 2021-08-31
Payer: MEDICARE

## 2021-08-31 VITALS
RESPIRATION RATE: 12 BRPM | TEMPERATURE: 98.2 F | SYSTOLIC BLOOD PRESSURE: 110 MMHG | HEIGHT: 61 IN | DIASTOLIC BLOOD PRESSURE: 60 MMHG | OXYGEN SATURATION: 97 % | WEIGHT: 92.6 LBS | BODY MASS INDEX: 17.48 KG/M2 | HEART RATE: 62 BPM

## 2021-08-31 DIAGNOSIS — I45.10 RBBB: ICD-10-CM

## 2021-08-31 DIAGNOSIS — M15.9 OSTEOARTHRITIS INVOLVING MULTIPLE JOINTS ON BOTH SIDES OF BODY: ICD-10-CM

## 2021-08-31 DIAGNOSIS — L89.321 PRESSURE INJURY OF LEFT BUTTOCK, STAGE 1: ICD-10-CM

## 2021-08-31 DIAGNOSIS — E06.3 HYPOTHYROIDISM DUE TO HASHIMOTO'S THYROIDITIS: ICD-10-CM

## 2021-08-31 DIAGNOSIS — R20.2 PARESTHESIA: ICD-10-CM

## 2021-08-31 DIAGNOSIS — F33.1 MODERATE EPISODE OF RECURRENT MAJOR DEPRESSIVE DISORDER (CMS/HCC): ICD-10-CM

## 2021-08-31 DIAGNOSIS — F32.A ANXIETY AND DEPRESSION: Primary | ICD-10-CM

## 2021-08-31 DIAGNOSIS — I49.1 ATRIAL ECTOPY: ICD-10-CM

## 2021-08-31 DIAGNOSIS — N18.2 CHRONIC KIDNEY DISEASE, STAGE II (MILD): ICD-10-CM

## 2021-08-31 DIAGNOSIS — F41.9 ANXIETY AND DEPRESSION: Primary | ICD-10-CM

## 2021-08-31 PROBLEM — I49.9 IRREGULAR HEART RHYTHM: Status: ACTIVE | Noted: 2021-08-31

## 2021-08-31 LAB
ANION GAP SERPL CALC-SCNC: 10 MEQ/L (ref 3–15)
BUN SERPL-MCNC: 22 MG/DL (ref 8–20)
CALCIUM SERPL-MCNC: 9.7 MG/DL (ref 8.9–10.3)
CHLORIDE SERPL-SCNC: 110 MEQ/L (ref 98–109)
CO2 SERPL-SCNC: 23 MEQ/L (ref 22–32)
CREAT SERPL-MCNC: 0.9 MG/DL (ref 0.6–1.1)
ERYTHROCYTE [DISTWIDTH] IN BLOOD BY AUTOMATED COUNT: 13.9 % (ref 11.7–14.4)
GFR SERPL CREATININE-BSD FRML MDRD: 59.7 ML/MIN/1.73M*2
GLUCOSE SERPL-MCNC: 119 MG/DL (ref 70–99)
HCT VFR BLDCO AUTO: 38.4 % (ref 35–45)
HGB BLD-MCNC: 12.3 G/DL (ref 11.8–15.7)
MAGNESIUM SERPL-MCNC: 1.9 MG/DL (ref 1.8–2.5)
MCH RBC QN AUTO: 30.6 PG (ref 28–33.2)
MCHC RBC AUTO-ENTMCNC: 32 G/DL (ref 32.2–35.5)
MCV RBC AUTO: 95.5 FL (ref 83–98)
PDW BLD AUTO: 9.8 FL (ref 9.4–12.3)
PLATELET # BLD AUTO: 250 K/UL (ref 150–369)
POTASSIUM SERPL-SCNC: 3.9 MEQ/L (ref 3.6–5.1)
RBC # BLD AUTO: 4.02 M/UL (ref 3.93–5.22)
SODIUM SERPL-SCNC: 143 MEQ/L (ref 136–144)
TSH SERPL DL<=0.05 MIU/L-ACNC: 0.13 MIU/L (ref 0.34–5.6)
WBC # BLD AUTO: 7.41 K/UL (ref 3.8–10.5)

## 2021-08-31 PROCEDURE — 83735 ASSAY OF MAGNESIUM: CPT | Performed by: FAMILY MEDICINE

## 2021-08-31 PROCEDURE — 84443 ASSAY THYROID STIM HORMONE: CPT | Performed by: FAMILY MEDICINE

## 2021-08-31 PROCEDURE — 85027 COMPLETE CBC AUTOMATED: CPT | Performed by: FAMILY MEDICINE

## 2021-08-31 PROCEDURE — 99214 OFFICE O/P EST MOD 30 MIN: CPT | Performed by: FAMILY MEDICINE

## 2021-08-31 PROCEDURE — 80048 BASIC METABOLIC PNL TOTAL CA: CPT | Performed by: FAMILY MEDICINE

## 2021-08-31 PROCEDURE — 93000 ELECTROCARDIOGRAM COMPLETE: CPT | Performed by: FAMILY MEDICINE

## 2021-09-01 ENCOUNTER — TELEPHONE (OUTPATIENT)
Dept: PRIMARY CARE | Facility: CLINIC | Age: 86
End: 2021-09-01

## 2021-09-01 DIAGNOSIS — E06.3 HYPOTHYROIDISM DUE TO HASHIMOTO'S THYROIDITIS: Primary | ICD-10-CM

## 2021-09-01 RX ORDER — LEVOTHYROXINE SODIUM 75 UG/1
75 TABLET ORAL
Qty: 90 TABLET | Refills: 0 | Status: SHIPPED | OUTPATIENT
Start: 2021-09-01 | End: 2021-10-12

## 2021-09-01 NOTE — TELEPHONE ENCOUNTER
S/w Jet re: lab results  - suppression of tsh could be leading to anxiety, paresthesia, and atrial ectopy- reduce levothyroxine from 88-> 75 mcg and repeat TSH in 6 wks (rx sent)   - also, still rec that they see Cardio within next 7-10d for repeat ekg- will call today and let me know if needing help obtaining appointment   - hold cymbalta adjustment until we reassess sx after normalization of tsh

## 2021-09-03 ENCOUNTER — TELEPHONE (OUTPATIENT)
Dept: PRIMARY CARE | Facility: CLINIC | Age: 86
End: 2021-09-03

## 2021-09-03 NOTE — TELEPHONE ENCOUNTER
Could we try to facilitate an appointment for her at the office of Dr. Ogden?  Her son sees him and hopefully they could expedite an appointment for her in the next 10 days in the setting of frequent atrial ectopy, which is possibly symptomatic

## 2021-09-03 NOTE — TELEPHONE ENCOUNTER
Message left on results voicemail as follows:    Soonest that pt's cardiologist can see her is January. Is there anything else he should do in the meantime? anoth doctor?

## 2021-09-03 NOTE — TELEPHONE ENCOUNTER
S/W Awa at Dr Ogden's office [phone] earliest appt for Pt would be September 22 at 2:30 with Dr Peace. Will contact pt's son with information.

## 2021-09-29 NOTE — TELEPHONE ENCOUNTER
Can we please call son to determine if patient saw cardiologist and if so, can we obtain the consult note?  She was supposed to have appointment on 9/22.    Also, can you schedule an appointment for her with me in the third week of October and make sure they get the lab test done prior?

## 2021-09-30 NOTE — TELEPHONE ENCOUNTER
Called Dr Ogden office [phone] and spoke with Courtney and she will fax over the office note

## 2021-09-30 NOTE — TELEPHONE ENCOUNTER
Called Fantasma and adeola to call us and schedule an apptment in of Oct and make sure that the b/w would be done before aptmennt

## 2021-10-10 PROBLEM — Z02.89 ENCOUNTER FOR COMPLETION OF FORM WITH PATIENT: Status: RESOLVED | Noted: 2020-12-30 | Resolved: 2021-10-10

## 2021-10-10 PROBLEM — B37.0 ORAL THRUSH: Status: RESOLVED | Noted: 2021-04-07 | Resolved: 2021-10-10

## 2021-10-11 ENCOUNTER — OFFICE VISIT (OUTPATIENT)
Dept: PRIMARY CARE | Facility: CLINIC | Age: 86
End: 2021-10-11
Payer: MEDICARE

## 2021-10-11 ENCOUNTER — APPOINTMENT (OUTPATIENT)
Dept: LAB | Age: 86
End: 2021-10-11
Attending: FAMILY MEDICINE
Payer: MEDICARE

## 2021-10-11 ENCOUNTER — TELEPHONE (OUTPATIENT)
Dept: PRIMARY CARE | Facility: CLINIC | Age: 86
End: 2021-10-11

## 2021-10-11 VITALS
BODY MASS INDEX: 17.75 KG/M2 | OXYGEN SATURATION: 95 % | DIASTOLIC BLOOD PRESSURE: 60 MMHG | TEMPERATURE: 98.3 F | WEIGHT: 94 LBS | HEIGHT: 61 IN | HEART RATE: 64 BPM | SYSTOLIC BLOOD PRESSURE: 112 MMHG

## 2021-10-11 DIAGNOSIS — R20.2 PARESTHESIA: ICD-10-CM

## 2021-10-11 DIAGNOSIS — K21.9 GERD WITHOUT ESOPHAGITIS: ICD-10-CM

## 2021-10-11 DIAGNOSIS — E06.3 HYPOTHYROIDISM DUE TO HASHIMOTO'S THYROIDITIS: ICD-10-CM

## 2021-10-11 DIAGNOSIS — Z23 NEED FOR IMMUNIZATION AGAINST INFLUENZA: ICD-10-CM

## 2021-10-11 DIAGNOSIS — R06.09 DOE (DYSPNEA ON EXERTION): ICD-10-CM

## 2021-10-11 DIAGNOSIS — I49.1 ATRIAL ECTOPY: ICD-10-CM

## 2021-10-11 DIAGNOSIS — F33.42 RECURRENT MAJOR DEPRESSIVE DISORDER, IN FULL REMISSION (CMS/HCC): ICD-10-CM

## 2021-10-11 DIAGNOSIS — E06.3 HYPOTHYROIDISM DUE TO HASHIMOTO'S THYROIDITIS: Primary | ICD-10-CM

## 2021-10-11 DIAGNOSIS — I45.10 RBBB: ICD-10-CM

## 2021-10-11 DIAGNOSIS — J45.20 MILD INTERMITTENT ASTHMA WITHOUT COMPLICATION: ICD-10-CM

## 2021-10-11 LAB — TSH SERPL DL<=0.05 MIU/L-ACNC: 0.36 MIU/L (ref 0.34–5.6)

## 2021-10-11 PROCEDURE — 99214 OFFICE O/P EST MOD 30 MIN: CPT | Mod: 25 | Performed by: FAMILY MEDICINE

## 2021-10-11 PROCEDURE — G0008 ADMIN INFLUENZA VIRUS VAC: HCPCS | Performed by: FAMILY MEDICINE

## 2021-10-11 PROCEDURE — 90694 VACC AIIV4 NO PRSRV 0.5ML IM: CPT | Performed by: FAMILY MEDICINE

## 2021-10-11 PROCEDURE — 84443 ASSAY THYROID STIM HORMONE: CPT

## 2021-10-11 PROCEDURE — 36415 COLL VENOUS BLD VENIPUNCTURE: CPT

## 2021-10-11 ASSESSMENT — ENCOUNTER SYMPTOMS
LIGHT-HEADEDNESS: 1
VOMITING: 0
CHEST TIGHTNESS: 0
FATIGUE: 0
DYSPHORIC MOOD: 0
SHORTNESS OF BREATH: 1
ARTHRALGIAS: 1
DIZZINESS: 0
APPETITE CHANGE: 0
UNEXPECTED WEIGHT CHANGE: 0

## 2021-10-11 NOTE — ASSESSMENT & PLAN NOTE
-Despite adjustment of her levothyroxine dosage, she remains with a very unchanged heart exam with ectopic, early beats noted after every second beat  -Due to this, she clearly needs to see a cardiologist in a timely fashion and unfortunately they had to reschedule the appointment that was scheduled for 2 weeks ago  -Her son, Jet, agrees to call today to schedule to be seen in the next 2 weeks-he will let us know if this is not the case  -In the meantime, if there is any worsening breathing, palpitations, diaphoresis, increased fatigue, or chest discomfort, they will go to the ER immediately

## 2021-10-11 NOTE — ASSESSMENT & PLAN NOTE
-Lungs are completely clear with no wheezing at all, so I do not think her dyspnea is related to asthma  -Rather, I suspect this is related to her ectopy

## 2021-10-11 NOTE — ASSESSMENT & PLAN NOTE
-She has noticed an improvement in her tingling, but it is still present primarily over her posterior scalp and neck with certain head motion  -She continues to decline PT, but is open to see me for OMT, so they will schedule in about 1 month and I look forward to treating her then

## 2021-10-11 NOTE — PROGRESS NOTES
"      Subjective      Patient ID: Lida Foley is a 86 y.o. female.  1935      Lida is here for follow-up of her thyroid and atrial ectopy  -Last visit, her TSH was overly suppressed at 0.13, so we reduced her levothyroxine from 88 down to 75 mcg  -She had a repeat TSH drawn this morning, but the result is not yet available  -Her BMP, CBC, and magnesium were normal  -denies observed sob. No exertional chest pain, palpitations, but does notice unchanged sob over past 3-4 mos.     -I did refer her to the cardiologist and she had an appointment on 9/22, but they need to reschedule and if not made this appointment yet  -Son, Jet, who accompanies her today notices an improvement -> more active, more mentally sharp, better energy-> \"doing very well\". Mood seems to be improved.   -She has not complained of tingling once to her son. Although she reports persistent sx, they have improved and are tolerable.       The following have been reviewed and updated as appropriate in this visit:  Tobacco  Allergies  Meds  Problems  Med Hx  Surg Hx  Fam Hx       Review of Systems   Constitutional: Negative for appetite change, fatigue and unexpected weight change (up 2 lbs since last visit).   Respiratory: Positive for shortness of breath. Negative for chest tightness.    Cardiovascular: Negative for chest pain and leg swelling.   Gastrointestinal: Negative for vomiting.        She continues to have acid reflux symptoms, including ascending acid and a vinegar type taste in her mouth.  There is no bloody stools or black stools and no swallowing issues/pain.   Musculoskeletal: Positive for arthralgias (at baseline).   Neurological: Positive for light-headedness (only related to head/neck position). Negative for dizziness and syncope.        See hpi re: tingling   Psychiatric/Behavioral: Negative for dysphoric mood (feeling improved with prayer/viting [place]. ).       Objective     Vitals:    10/11/21 1447   BP: " "112/60   BP Location: Left upper arm   Patient Position: Sitting   Pulse: 64   Temp: 36.8 °C (98.3 °F)   SpO2: 95%   Weight: 42.6 kg (94 lb)   Height: 1.549 m (5' 1\")     Body mass index is 17.76 kg/m².    Physical Exam  Constitutional:       General: She is not in acute distress.     Appearance: Normal appearance. She is not ill-appearing, toxic-appearing or diaphoretic.   HENT:      Head: Normocephalic and atraumatic.   Cardiovascular:      Rate and Rhythm: Normal rate.      Heart sounds: Normal heart sounds. No murmur heard.  No friction rub. No gallop.       Comments: Regular rhythm with frequent ectopy, which feels by palpation and auscultation to be exactly the same as last visit  Pulmonary:      Effort: Pulmonary effort is normal. No respiratory distress.      Breath sounds: Normal breath sounds. No stridor. No wheezing, rhonchi or rales.   Abdominal:      General: Bowel sounds are normal. There is no distension.      Palpations: Abdomen is soft. Abdomen is not rigid. There is no mass.      Tenderness: There is no abdominal tenderness. There is no guarding or rebound.      Hernia: No hernia is present.   Musculoskeletal:      Right lower leg: No edema.      Left lower leg: No edema.   Neurological:      Mental Status: She is alert.   Psychiatric:      Comments: much more upbeat and conversational.          Assessment/Plan   Diagnoses and all orders for this visit:    Hypothyroidism due to Hashimoto's thyroiditis (Primary)  Assessment & Plan:  -Certainly sounds like Lida is more functional since last visit  -She has been more active and has a more upbeat, positive affect today  -She is eating great and is even gained 2 pounds since last visit  -I look forward to reviewing the TSH that was drawn today      Recurrent major depressive disorder, in full remission (CMS/HCC)    Mild intermittent asthma without complication  Assessment & Plan:  -Lungs are completely clear with no wheezing at all, so I do not think " her dyspnea is related to asthma  -Rather, I suspect this is related to her ectopy      CASPER (dyspnea on exertion)  -     Ambulatory referral to Cardiology; Future    Atrial ectopy  Assessment & Plan:  -Despite adjustment of her levothyroxine dosage, she remains with a very unchanged heart exam with ectopic, early beats noted after every second beat  -Due to this, she clearly needs to see a cardiologist in a timely fashion and unfortunately they had to reschedule the appointment that was scheduled for 2 weeks ago  -Her son, Jet, agrees to call today to schedule to be seen in the next 2 weeks-he will let us know if this is not the case  -In the meantime, if there is any worsening breathing, palpitations, diaphoresis, increased fatigue, or chest discomfort, they will go to the ER immediately    Orders:  -     Ambulatory referral to Cardiology; Future    RBBB  -     Ambulatory referral to Cardiology; Future    Paresthesia  Assessment & Plan:  -She has noticed an improvement in her tingling, but it is still present primarily over her posterior scalp and neck with certain head motion  -She continues to decline PT, but is open to see me for OMT, so they will schedule in about 1 month and I look forward to treating her then      GERD without esophagitis  Assessment & Plan:  -Despite being on omeprazole 40 mg twice daily, she continues to notice ascending acidity and a briny taste in her mouth  -She does eat consistently throughout the day, so I encouraged her not to lie flat for 2 hours after eating  -Although she has no red flag symptoms and abdomen is benign, I would like for them to see GI in the next 2 months to discuss alternative medication options to keep her symptoms at bay  -If she develops any abdominal pain, black stools, or swallowing issues in the meantime, they would call me immediately    Orders:  -     Ambulatory referral to Gastroenterology; Future    Need for immunization against influenza  -     Influenza  vaccine Quad Adjuvanted 65 and Older (FluAd Quad)

## 2021-10-11 NOTE — ASSESSMENT & PLAN NOTE
-Despite being on omeprazole 40 mg twice daily, she continues to notice ascending acidity and a briny taste in her mouth  -She does eat consistently throughout the day, so I encouraged her not to lie flat for 2 hours after eating  -Although she has no red flag symptoms and abdomen is benign, I would like for them to see GI in the next 2 months to discuss alternative medication options to keep her symptoms at bay  -If she develops any abdominal pain, black stools, or swallowing issues in the meantime, they would call me immediately

## 2021-10-11 NOTE — ASSESSMENT & PLAN NOTE
-Certainly sounds like Lida is more functional since last visit  -She has been more active and has a more upbeat, positive affect today  -She is eating great and is even gained 2 pounds since last visit  -I look forward to reviewing the TSH that was drawn today

## 2021-10-12 ENCOUNTER — TELEPHONE (OUTPATIENT)
Dept: PRIMARY CARE | Facility: CLINIC | Age: 86
End: 2021-10-12

## 2021-10-12 DIAGNOSIS — E06.3 HYPOTHYROIDISM DUE TO HASHIMOTO'S THYROIDITIS: Primary | ICD-10-CM

## 2021-10-12 RX ORDER — LEVOTHYROXINE SODIUM 50 UG/1
50 TABLET ORAL
Qty: 60 TABLET | Refills: 0 | Status: SHIPPED | OUTPATIENT
Start: 2021-10-12 | End: 2021-12-06

## 2021-10-12 NOTE — TELEPHONE ENCOUNTER
Spoke with son, Jet, regarding TSH  -Although within the appropriate range for her age, it is still <0.5  -Given her atrial ectopy and the fact that I would like to avoid overtreatment of hypothyroidism that could contribute to atrial fibrillation, we will reduce her dosage further to 50 mcg daily and repeat TSH in 6 weeks  -I will see her thereafter to review the result and he will let me know if any symptoms of hypothyroidism, including increased fatigue, recurrent depression, constipation, or decreased functional status in the interim  -He is in the process of calling cardiology and he will let us know when the appointment is, in case we need to expedite again for them

## 2021-10-26 RX ORDER — ALLOPURINOL 300 MG/1
150 TABLET ORAL
Qty: 45 TABLET | Refills: 1 | Status: SHIPPED | OUTPATIENT
Start: 2021-10-26 | End: 2022-05-02

## 2021-10-26 NOTE — TELEPHONE ENCOUNTER
Medicine Refill Request    Last Office Visit: 3/9/2020  Last Telemedicine Visit: 4/21/2020 Bandar Acharya MD    Next Office Visit: Visit date not found  Next Telemedicine Visit: Visit date not found         Current Outpatient Medications:   •  albuterol HFA (PROAIR HFA) 90 mcg/actuation inhaler, Inhale 2 puffs every 4 (four) hours as needed for wheezing., Disp: 1 Inhaler, Rfl: 1  •  allopurinoL (ZYLOPRIM) 300 mg tablet, Take 150 mg by mouth daily., Disp: , Rfl:   •  cholecalciferol, vitamin D3, 1,000 unit (25 mcg) tablet, Take 1,000 Units by mouth daily., Disp: , Rfl:   •  conjugated estrogens (PREMARIN) 0.625 mg/gram vaginal cream, apply small pea sized amount to external vaginal/urethral area daily for 2 weeks then 2x/week, Disp: , Rfl:   •  cyanocobalamin (VITAMIN B12) 1,000 mcg tablet, Take 1,000 mcg by mouth daily., Disp: , Rfl:   •  diclofenac sodium (VOLTAREN) 1 % topical gel, apply 4 grams to affected area four times a day, Disp: , Rfl: 0  •  DULoxetine (CYMBALTA) 20 mg capsule, take 1 capsule by mouth twice a day, Disp: 180 capsule, Rfl: 0  •  fexofenadine (ALLEGRA) 180 mg tablet, Take 180 mg by mouth daily., Disp: , Rfl:   •  levothyroxine (SYNTHROID) 50 mcg tablet, Take 1 tablet (50 mcg total) by mouth daily., Disp: 60 tablet, Rfl: 0  •  magnesium oxide 400 mg capsule, 400 mg., Disp: , Rfl:   •  omeprazole (PriLOSEC) 40 mg capsule, Take 40 mg by mouth 2 (two) times a day., Disp: , Rfl:       BP Readings from Last 3 Encounters:   10/11/21 112/60   08/31/21 110/60   04/07/21 120/60       Recent Lab results:  Lab Results   Component Value Date    CHOL 218 (H) 01/04/2021   ,   Lab Results   Component Value Date    HDL 88 01/04/2021   ,   Lab Results   Component Value Date    LDLCALC 114 (H) 01/04/2021   ,   Lab Results   Component Value Date    TRIG 80 01/04/2021        Lab Results   Component Value Date    GLUCOSE 119 (H) 08/31/2021   , No results found for: HGBA1C      Lab Results   Component Value  Date    CREATININE 0.9 08/31/2021       Lab Results   Component Value Date    TSH 0.36 10/11/2021

## 2021-11-02 NOTE — ASSESSMENT & PLAN NOTE
Scripts sent     She is requesting order for walker b/c brake broke. I think it has been too short to have medicare cover. She is going to ask her son to look at brake.

## 2021-12-06 RX ORDER — LEVOTHYROXINE SODIUM 50 UG/1
TABLET ORAL
Qty: 30 TABLET | Refills: 0 | Status: SHIPPED | OUTPATIENT
Start: 2021-12-06 | End: 2022-01-10

## 2021-12-06 NOTE — TELEPHONE ENCOUNTER
Medicine Refill Request    Last Office Visit: 10/11/2021  Last Telemedicine Visit: Visit date not found    Next Office Visit: 12/30/2021  Next Telemedicine Visit: Visit date not found     Pended for refill     Current Outpatient Medications:   •  albuterol HFA (PROAIR HFA) 90 mcg/actuation inhaler, Inhale 2 puffs every 4 (four) hours as needed for wheezing., Disp: 1 Inhaler, Rfl: 1  •  allopurinoL 300 mg tablet, Take 0.5 tablets (150 mg total) by mouth once daily., Disp: 45 tablet, Rfl: 1  •  cholecalciferol, vitamin D3, 1,000 unit (25 mcg) tablet, Take 1,000 Units by mouth daily., Disp: , Rfl:   •  conjugated estrogens (PREMARIN) 0.625 mg/gram vaginal cream, apply small pea sized amount to external vaginal/urethral area daily for 2 weeks then 2x/week, Disp: , Rfl:   •  cyanocobalamin (VITAMIN B12) 1,000 mcg tablet, Take 1,000 mcg by mouth daily., Disp: , Rfl:   •  diclofenac sodium (VOLTAREN) 1 % topical gel, apply 4 grams to affected area four times a day, Disp: , Rfl: 0  •  DULoxetine (CYMBALTA) 20 mg capsule, take 1 capsule by mouth twice a day, Disp: 180 capsule, Rfl: 0  •  fexofenadine (ALLEGRA) 180 mg tablet, Take 180 mg by mouth daily., Disp: , Rfl:   •  levothyroxine (SYNTHROID) 50 mcg tablet, Take 1 tablet (50 mcg total) by mouth daily., Disp: 60 tablet, Rfl: 0  •  magnesium oxide 400 mg capsule, 400 mg., Disp: , Rfl:   •  omeprazole (PriLOSEC) 40 mg capsule, Take 40 mg by mouth 2 (two) times a day., Disp: , Rfl:       BP Readings from Last 3 Encounters:   10/11/21 112/60   08/31/21 110/60   04/07/21 120/60       Recent Lab results:  Lab Results   Component Value Date    CHOL 218 (H) 01/04/2021   ,   Lab Results   Component Value Date    HDL 88 01/04/2021   ,   Lab Results   Component Value Date    LDLCALC 114 (H) 01/04/2021   ,   Lab Results   Component Value Date    TRIG 80 01/04/2021        Lab Results   Component Value Date    GLUCOSE 119 (H) 08/31/2021   , No results found for: HGBA1C      Lab Results    Component Value Date    CREATININE 0.9 08/31/2021       Lab Results   Component Value Date    TSH 0.36 10/11/2021

## 2021-12-21 NOTE — TELEPHONE ENCOUNTER
Medicine Refill Request    Last Office Visit: 3/9/2020  Next Office Visit: Visit date not found        Current Outpatient Medications:   •  albuterol HFA (PROAIR HFA) 90 mcg/actuation inhaler, Inhale 2 puffs every 4 (four) hours as needed for wheezing., Disp: 1 Inhaler, Rfl: 1  •  allopurinol (ZYLOPRIM) 300 mg tablet, Take 1 tablet (300 mg total) by mouth once daily., Disp: 90 tablet, Rfl: 1  •  citalopram (celeXA) 20 mg tablet, Take 1 tablet (20 mg total) by mouth once daily., Disp: 90 tablet, Rfl: 1  •  conjugated estrogens (PREMARIN) 0.625 mg/gram vaginal cream, apply small pea sized amount to external vaginal/urethral area daily for 2 weeks then 2x/week, Disp: , Rfl:   •  dexlansoprazole (DEXILANT) 60 mg capsule, Take 1 capsule (60 mg total) by mouth daily., Disp: 30 capsule, Rfl: 6  •  diclofenac sodium (VOLTAREN) 1 % topical gel, apply 4 grams to affected area four times a day, Disp: , Rfl: 0  •  estradiol (ESTRACE) 0.01 % (0.1 mg/gram) vaginal cream, insert (1G)  by vaginal route 2x/week, Disp: , Rfl:   •  fexofenadine (ALLEGRA) 180 mg tablet, Take 180 mg by mouth daily., Disp: , Rfl:   •  fluticasone (FLONASE) 50 mcg/actuation nasal spray, Administer 1 spray into each nostril 2 (two) times a day., Disp: 1 Bottle, Rfl: 11  •  levothyroxine (SYNTHROID) 88 mcg tablet, Take 1 tablet (88 mcg total) by mouth daily., Disp: 30 tablet, Rfl: 1  •  magnesium oxide 400 mg capsule, 400 mg., Disp: , Rfl:   •  mupirocin (BACTROBAN) 2 % ointment, 2 %., Disp: , Rfl:   •  pramipexole (MIRAPEX) 0.125 mg tablet, take 1 tablet by mouth once daily NIGHTLY, Disp: 30 tablet, Rfl: 1  •  prochlorperazine (COMPAZINE) 5 mg tablet, take 1 tablet by mouth every 8 hours if needed for nausea and vomiting, Disp: 30 tablet, Rfl: 1  •  zoledronic acid (RECLAST) IVPB, Infuse 5 mg into a venous catheter once., Disp: , Rfl:       BP Readings from Last 3 Encounters:   03/09/20 100/62   01/09/20 120/64   12/12/19 102/60       Recent Lab  results:  Lab Results   Component Value Date    CHOL 208 (H) 12/12/2019   ,   Lab Results   Component Value Date    HDL 68 12/12/2019   ,   Lab Results   Component Value Date    LDLCALC 118 (H) 12/12/2019   ,   Lab Results   Component Value Date    TRIG 110 12/12/2019        Lab Results   Component Value Date    GLUCOSE 90 12/12/2019   , No results found for: HGBA1C      Lab Results   Component Value Date    CREATININE 0.7 12/12/2019       Lab Results   Component Value Date    TSH 20.51 (H) 03/09/2020            - - -

## 2022-01-10 RX ORDER — LEVOTHYROXINE SODIUM 50 UG/1
TABLET ORAL
Qty: 30 TABLET | Refills: 0 | Status: SHIPPED | OUTPATIENT
Start: 2022-01-10 | End: 2022-02-07

## 2022-01-13 RX ORDER — DULOXETIN HYDROCHLORIDE 20 MG/1
CAPSULE, DELAYED RELEASE ORAL
Qty: 180 CAPSULE | Refills: 0 | Status: SHIPPED | OUTPATIENT
Start: 2022-01-13 | End: 2022-04-15

## 2022-01-13 NOTE — TELEPHONE ENCOUNTER
Medicine Refill Request    Last Office: 10/11/2021   Last Consult Visit: Visit date not found  Last Telemedicine Visit: Visit date not found    Next Appointment: Visit date not found  Pended refill     Current Outpatient Medications:   •  albuterol HFA (PROAIR HFA) 90 mcg/actuation inhaler, Inhale 2 puffs every 4 (four) hours as needed for wheezing., Disp: 1 Inhaler, Rfl: 1  •  allopurinoL 300 mg tablet, Take 0.5 tablets (150 mg total) by mouth once daily., Disp: 45 tablet, Rfl: 1  •  cholecalciferol, vitamin D3, 1,000 unit (25 mcg) tablet, Take 1,000 Units by mouth daily., Disp: , Rfl:   •  conjugated estrogens (PREMARIN) 0.625 mg/gram vaginal cream, apply small pea sized amount to external vaginal/urethral area daily for 2 weeks then 2x/week, Disp: , Rfl:   •  cyanocobalamin (VITAMIN B12) 1,000 mcg tablet, Take 1,000 mcg by mouth daily., Disp: , Rfl:   •  diclofenac sodium (VOLTAREN) 1 % topical gel, apply 4 grams to affected area four times a day, Disp: , Rfl: 0  •  DULoxetine (CYMBALTA) 20 mg capsule, take 1 capsule by mouth twice a day, Disp: 180 capsule, Rfl: 0  •  fexofenadine (ALLEGRA) 180 mg tablet, Take 180 mg by mouth daily., Disp: , Rfl:   •  levothyroxine 50 mcg tablet, take 1 tablet by mouth once daily, Disp: 30 tablet, Rfl: 0  •  magnesium oxide 400 mg capsule, 400 mg., Disp: , Rfl:   •  omeprazole (PriLOSEC) 40 mg capsule, Take 40 mg by mouth 2 (two) times a day., Disp: , Rfl:       BP Readings from Last 3 Encounters:   10/11/21 112/60   08/31/21 110/60   04/07/21 120/60       Recent Lab results:  Lab Results   Component Value Date    CHOL 218 (H) 01/04/2021   ,   Lab Results   Component Value Date    HDL 88 01/04/2021   ,   Lab Results   Component Value Date    LDLCALC 114 (H) 01/04/2021   ,   Lab Results   Component Value Date    TRIG 80 01/04/2021        Lab Results   Component Value Date    GLUCOSE 119 (H) 08/31/2021   , No results found for: HGBA1C      Lab Results   Component Value Date     CREATININE 0.9 08/31/2021       Lab Results   Component Value Date    TSH 0.36 10/11/2021

## 2022-02-07 RX ORDER — LEVOTHYROXINE SODIUM 50 UG/1
TABLET ORAL
Qty: 30 TABLET | Refills: 0 | Status: SHIPPED | OUTPATIENT
Start: 2022-02-07 | End: 2022-03-18

## 2022-02-07 NOTE — TELEPHONE ENCOUNTER
Please call Jet and let him know that I am still concerned that the never had the labs drawn.  Please have them get this done ASAP

## 2022-02-07 NOTE — TELEPHONE ENCOUNTER
Medicine Refill Request    Last Office: 10/11/2021   Last Consult Visit: Visit date not found  Last Telemedicine Visit: Visit date not found    Next Appointment: Visit date not found  Pended refill     Current Outpatient Medications:   •  albuterol HFA (PROAIR HFA) 90 mcg/actuation inhaler, Inhale 2 puffs every 4 (four) hours as needed for wheezing., Disp: 1 Inhaler, Rfl: 1  •  allopurinoL 300 mg tablet, Take 0.5 tablets (150 mg total) by mouth once daily., Disp: 45 tablet, Rfl: 1  •  cholecalciferol, vitamin D3, 1,000 unit (25 mcg) tablet, Take 1,000 Units by mouth daily., Disp: , Rfl:   •  conjugated estrogens (PREMARIN) 0.625 mg/gram vaginal cream, apply small pea sized amount to external vaginal/urethral area daily for 2 weeks then 2x/week, Disp: , Rfl:   •  cyanocobalamin (VITAMIN B12) 1,000 mcg tablet, Take 1,000 mcg by mouth daily., Disp: , Rfl:   •  diclofenac sodium (VOLTAREN) 1 % topical gel, apply 4 grams to affected area four times a day, Disp: , Rfl: 0  •  DULoxetine 20 mg capsule, take 1 capsule by mouth twice a day, Disp: 180 capsule, Rfl: 0  •  fexofenadine (ALLEGRA) 180 mg tablet, Take 180 mg by mouth daily., Disp: , Rfl:   •  levothyroxine 50 mcg tablet, take 1 tablet by mouth once daily, Disp: 30 tablet, Rfl: 0  •  magnesium oxide 400 mg capsule, 400 mg., Disp: , Rfl:   •  omeprazole (PriLOSEC) 40 mg capsule, Take 40 mg by mouth 2 (two) times a day., Disp: , Rfl:       BP Readings from Last 3 Encounters:   10/11/21 112/60   08/31/21 110/60   04/07/21 120/60       Recent Lab results:  Lab Results   Component Value Date    CHOL 218 (H) 01/04/2021   ,   Lab Results   Component Value Date    HDL 88 01/04/2021   ,   Lab Results   Component Value Date    LDLCALC 114 (H) 01/04/2021   ,   Lab Results   Component Value Date    TRIG 80 01/04/2021        Lab Results   Component Value Date    GLUCOSE 119 (H) 08/31/2021   , No results found for: HGBA1C      Lab Results   Component Value Date    CREATININE 0.9  08/31/2021       Lab Results   Component Value Date    TSH 0.36 10/11/2021

## 2022-03-16 ENCOUNTER — APPOINTMENT (OUTPATIENT)
Dept: LAB | Facility: CLINIC | Age: 87
End: 2022-03-16
Attending: FAMILY MEDICINE
Payer: MEDICARE

## 2022-03-16 DIAGNOSIS — E06.3 HYPOTHYROIDISM DUE TO HASHIMOTO'S THYROIDITIS: ICD-10-CM

## 2022-03-16 LAB — TSH SERPL DL<=0.05 MIU/L-ACNC: 47 MIU/L (ref 0.34–5.6)

## 2022-03-16 PROCEDURE — 84443 ASSAY THYROID STIM HORMONE: CPT

## 2022-03-16 PROCEDURE — 36415 COLL VENOUS BLD VENIPUNCTURE: CPT

## 2022-03-17 ENCOUNTER — TELEPHONE (OUTPATIENT)
Dept: PRIMARY CARE | Facility: CLINIC | Age: 87
End: 2022-03-17
Payer: MEDICARE

## 2022-03-17 DIAGNOSIS — E78.5 HYPERLIPIDEMIA, UNSPECIFIED HYPERLIPIDEMIA TYPE: ICD-10-CM

## 2022-03-17 DIAGNOSIS — E06.3 HYPOTHYROIDISM DUE TO HASHIMOTO'S THYROIDITIS: Primary | ICD-10-CM

## 2022-03-17 DIAGNOSIS — E55.9 VITAMIN D DEFICIENCY: ICD-10-CM

## 2022-03-17 NOTE — TELEPHONE ENCOUNTER
LVM to call office back to discuss test results.  -I had reduced levothyroxine from 75 mcg down to 50 mcg in October and the only had the blood test done now  -At that point, her TSH was less than 0.5 and now it is over 47, so I am concerned that she is not taking the medication at all  -I look forward to hearing back from son, Jet

## 2022-03-18 RX ORDER — LEVOTHYROXINE SODIUM 75 UG/1
75 TABLET ORAL
Qty: 90 TABLET | Refills: 0 | Status: SHIPPED | OUTPATIENT
Start: 2022-03-18 | End: 2022-09-01 | Stop reason: SDUPTHER

## 2022-03-18 NOTE — TELEPHONE ENCOUNTER
Spoke with son, Jet, regarding labs  -He reports that patient has been very consistent with levothyroxine, only missing one dosage on the day before the blood test  -She is still going about her normal daily activities, but has been slightly more fatigued than baseline and occasionally constipated  -We reduced her dosage of levothyroxine from 75 down to 50 mcg previously  -I will increase her back to 75 mcg and we will repeat labs before she sees me next as scheduled on 5/12  -He agrees to plan and will let me know if any worsening fatigue, increased depression, or change in mental status at all    Note: He confirms that she has been taking her medication at least 30 minutes prior to eating or taking any of her other pills-continue same

## 2022-04-15 RX ORDER — DULOXETIN HYDROCHLORIDE 20 MG/1
CAPSULE, DELAYED RELEASE ORAL
Qty: 180 CAPSULE | Refills: 0 | Status: SHIPPED | OUTPATIENT
Start: 2022-04-15 | End: 2022-08-18

## 2022-04-15 NOTE — TELEPHONE ENCOUNTER
Medicine Refill Request    Last Office: 10/11/2021   Last Consult Visit: Visit date not found  Last Telemedicine Visit: Visit date not found    Next Appointment: 5/12/2022  Pended refill     Current Outpatient Medications:   •  albuterol HFA (PROAIR HFA) 90 mcg/actuation inhaler, Inhale 2 puffs every 4 (four) hours as needed for wheezing., Disp: 1 Inhaler, Rfl: 1  •  allopurinoL 300 mg tablet, Take 0.5 tablets (150 mg total) by mouth once daily., Disp: 45 tablet, Rfl: 1  •  cholecalciferol, vitamin D3, 1,000 unit (25 mcg) tablet, Take 1,000 Units by mouth daily., Disp: , Rfl:   •  conjugated estrogens (PREMARIN) 0.625 mg/gram vaginal cream, apply small pea sized amount to external vaginal/urethral area daily for 2 weeks then 2x/week, Disp: , Rfl:   •  cyanocobalamin (VITAMIN B12) 1,000 mcg tablet, Take 1,000 mcg by mouth daily., Disp: , Rfl:   •  diclofenac sodium (VOLTAREN) 1 % topical gel, apply 4 grams to affected area four times a day, Disp: , Rfl: 0  •  DULoxetine 20 mg capsule, take 1 capsule by mouth twice a day, Disp: 180 capsule, Rfl: 0  •  fexofenadine (ALLEGRA) 180 mg tablet, Take 180 mg by mouth daily., Disp: , Rfl:   •  levothyroxine (SYNTHROID) 75 mcg tablet, Take 1 tablet (75 mcg total) by mouth daily., Disp: 90 tablet, Rfl: 0  •  magnesium oxide 400 mg capsule, 400 mg., Disp: , Rfl:   •  omeprazole (PriLOSEC) 40 mg capsule, Take 40 mg by mouth 2 (two) times a day., Disp: , Rfl:       BP Readings from Last 3 Encounters:   10/11/21 112/60   08/31/21 110/60   04/07/21 120/60       Recent Lab results:  Lab Results   Component Value Date    CHOL 218 (H) 01/04/2021   ,   Lab Results   Component Value Date    HDL 88 01/04/2021   ,   Lab Results   Component Value Date    LDLCALC 114 (H) 01/04/2021   ,   Lab Results   Component Value Date    TRIG 80 01/04/2021        Lab Results   Component Value Date    GLUCOSE 119 (H) 08/31/2021   , No results found for: HGBA1C      Lab Results   Component Value Date     CREATININE 0.9 08/31/2021       Lab Results   Component Value Date    TSH 47.00 (H) 03/16/2022

## 2022-05-02 RX ORDER — ALLOPURINOL 300 MG/1
150 TABLET ORAL
Qty: 45 TABLET | Refills: 0 | Status: SHIPPED | OUTPATIENT
Start: 2022-05-02 | End: 2022-08-03

## 2022-05-02 NOTE — TELEPHONE ENCOUNTER
Medicine Refill Request    Last Office: 10/11/2021   Last Consult Visit: Visit date not found  Last Telemedicine Visit: Visit date not found    Next Appointment: 5/12/2022  Pended refill     Current Outpatient Medications:   •  albuterol HFA (PROAIR HFA) 90 mcg/actuation inhaler, Inhale 2 puffs every 4 (four) hours as needed for wheezing., Disp: 1 Inhaler, Rfl: 1  •  allopurinoL 300 mg tablet, Take 0.5 tablets (150 mg total) by mouth once daily., Disp: 45 tablet, Rfl: 1  •  cholecalciferol, vitamin D3, 1,000 unit (25 mcg) tablet, Take 1,000 Units by mouth daily., Disp: , Rfl:   •  conjugated estrogens (PREMARIN) 0.625 mg/gram vaginal cream, apply small pea sized amount to external vaginal/urethral area daily for 2 weeks then 2x/week, Disp: , Rfl:   •  cyanocobalamin (VITAMIN B12) 1,000 mcg tablet, Take 1,000 mcg by mouth daily., Disp: , Rfl:   •  diclofenac sodium (VOLTAREN) 1 % topical gel, apply 4 grams to affected area four times a day, Disp: , Rfl: 0  •  DULoxetine (CYMBALTA) 20 mg capsule, take 1 capsule by mouth twice a day, Disp: 180 capsule, Rfl: 0  •  fexofenadine (ALLEGRA) 180 mg tablet, Take 180 mg by mouth daily., Disp: , Rfl:   •  levothyroxine (SYNTHROID) 75 mcg tablet, Take 1 tablet (75 mcg total) by mouth daily., Disp: 90 tablet, Rfl: 0  •  magnesium oxide 400 mg capsule, 400 mg., Disp: , Rfl:   •  omeprazole (PriLOSEC) 40 mg capsule, Take 40 mg by mouth 2 (two) times a day., Disp: , Rfl:       BP Readings from Last 3 Encounters:   10/11/21 112/60   08/31/21 110/60   04/07/21 120/60       Recent Lab results:  Lab Results   Component Value Date    CHOL 218 (H) 01/04/2021   ,   Lab Results   Component Value Date    HDL 88 01/04/2021   ,   Lab Results   Component Value Date    LDLCALC 114 (H) 01/04/2021   ,   Lab Results   Component Value Date    TRIG 80 01/04/2021        Lab Results   Component Value Date    GLUCOSE 119 (H) 08/31/2021   , No results found for: HGBA1C      Lab Results   Component Value  Date    CREATININE 0.9 08/31/2021       Lab Results   Component Value Date    TSH 47.00 (H) 03/16/2022

## 2022-05-12 ENCOUNTER — OFFICE VISIT (OUTPATIENT)
Dept: PRIMARY CARE | Facility: CLINIC | Age: 87
End: 2022-05-12
Payer: MEDICARE

## 2022-05-12 VITALS
DIASTOLIC BLOOD PRESSURE: 70 MMHG | BODY MASS INDEX: 20.8 KG/M2 | HEART RATE: 88 BPM | WEIGHT: 103.2 LBS | OXYGEN SATURATION: 97 % | RESPIRATION RATE: 16 BRPM | HEIGHT: 59 IN | TEMPERATURE: 97.3 F | SYSTOLIC BLOOD PRESSURE: 110 MMHG

## 2022-05-12 DIAGNOSIS — F32.A ANXIETY AND DEPRESSION: ICD-10-CM

## 2022-05-12 DIAGNOSIS — N18.2 CHRONIC KIDNEY DISEASE, STAGE II (MILD): ICD-10-CM

## 2022-05-12 DIAGNOSIS — I49.1 ATRIAL ECTOPY: ICD-10-CM

## 2022-05-12 DIAGNOSIS — E78.5 HYPERLIPIDEMIA, UNSPECIFIED HYPERLIPIDEMIA TYPE: ICD-10-CM

## 2022-05-12 DIAGNOSIS — M15.9 OSTEOARTHRITIS INVOLVING MULTIPLE JOINTS ON BOTH SIDES OF BODY: ICD-10-CM

## 2022-05-12 DIAGNOSIS — S51.812A SKIN TEAR OF LEFT FOREARM WITHOUT COMPLICATION, INITIAL ENCOUNTER: ICD-10-CM

## 2022-05-12 DIAGNOSIS — K21.9 GERD WITHOUT ESOPHAGITIS: ICD-10-CM

## 2022-05-12 DIAGNOSIS — E06.3 HYPOTHYROIDISM DUE TO HASHIMOTO'S THYROIDITIS: Primary | ICD-10-CM

## 2022-05-12 DIAGNOSIS — M81.0 AGE-RELATED OSTEOPOROSIS WITHOUT CURRENT PATHOLOGICAL FRACTURE: ICD-10-CM

## 2022-05-12 DIAGNOSIS — F41.9 ANXIETY AND DEPRESSION: ICD-10-CM

## 2022-05-12 PROBLEM — F33.42 RECURRENT MAJOR DEPRESSIVE DISORDER, IN FULL REMISSION (CMS/HCC): Status: RESOLVED | Noted: 2021-04-07 | Resolved: 2022-05-12

## 2022-05-12 PROCEDURE — 90471 IMMUNIZATION ADMIN: CPT | Performed by: FAMILY MEDICINE

## 2022-05-12 PROCEDURE — 99214 OFFICE O/P EST MOD 30 MIN: CPT | Mod: 25 | Performed by: FAMILY MEDICINE

## 2022-05-12 PROCEDURE — 90714 TD VACC NO PRESV 7 YRS+ IM: CPT | Performed by: FAMILY MEDICINE

## 2022-05-12 RX ORDER — MUPIROCIN 20 MG/G
1 OINTMENT TOPICAL 3 TIMES DAILY
Qty: 15 G | Refills: 0 | Status: SHIPPED | OUTPATIENT
Start: 2022-05-12 | End: 2022-05-22

## 2022-05-12 ASSESSMENT — ENCOUNTER SYMPTOMS
ARTHRALGIAS: 1
PALPITATIONS: 0
ANAL BLEEDING: 0
SHORTNESS OF BREATH: 0
CHILLS: 0
LIGHT-HEADEDNESS: 1
BLOOD IN STOOL: 0
FEVER: 0
APPETITE CHANGE: 0
VOMITING: 0
CHEST TIGHTNESS: 0
ROS GI COMMENTS: SEE HPI
DYSPHORIC MOOD: 1

## 2022-05-12 NOTE — ASSESSMENT & PLAN NOTE
- I auscultated her heart for a full 60 seconds today and there was no ectopy whatsoever  -Perhaps this is due to normalization of her thyroid function and a TSH will confirm this tomorrow  -They never saw cardiology as I recommended at her dyspnea has resolved  -She no longer has palpitations either, but does feel slight lightheadedness upon standing which is likely orthostatic  -Recommended hydration and they continue to refuse cardiology consult, so we will simply monitor every 6 months and ensure appropriate electrolytes and thyroid function

## 2022-05-12 NOTE — ASSESSMENT & PLAN NOTE
- Trend vitamin D with labs  -Not a candidate for oral therapy due to her GERD issues and they declined treatment with other injectable options

## 2022-05-12 NOTE — ASSESSMENT & PLAN NOTE
- Seems to be more clinically euthyroid, though her weight has increased  -She is overdue to have repeat TSH drawn, so they will go tomorrow

## 2022-05-12 NOTE — ASSESSMENT & PLAN NOTE
- No symptoms of superimposed cellulitis or abscess, but warning signs given  -Appears to be healing  -Add mupirocin 3 times daily to aid in this process  -Update Td today

## 2022-05-12 NOTE — ASSESSMENT & PLAN NOTE
- With the death of her niece 2 weeks ago, and admits that she is more depressed than usual, which is not unexpected  -She is on the maximum dosage of Cymbalta for her kidney function  -I offered her psychotherapy for grief counseling with our office through our psychologist, Av, but she declines this  -They will let me know if they want to pursue this and will continue with prayer and relying on friends/family for support

## 2022-05-12 NOTE — ASSESSMENT & PLAN NOTE
- Still not yet seen GI despite my recommendation  -She continues with swallowing issues, which does warrant evaluation  -Fortunately, she is not choking or coughing with meals, but warning signs given  -Perhaps GI can switch her to an alternative acid suppressing regimen as she is having persistent symptoms despite omeprazole 40 mg twice daily

## 2022-05-12 NOTE — PROGRESS NOTES
"      Subjective      Patient ID: Lida Foley is a 86 y.o. female.  1935      Lida is here with her son Jet for a follow-up of hypothyroidism  -In March, her TSH had risen from 0.36 up to 47 so I increased her levothyroxine back from 50 up to 75 mcg and she was supposed to have TSH done prior to today's visit, but they did not    Atrial ectopy  -I referred her to cardiology, Dr. Zuniga, but they never scheduled  -denies any palpitations, sob, chest pain. Does still LH upon standing fleeting. No recent falls.     GERD  -Due to persistent GERD symptoms, I also referred her to GI, especially since breakthrough despite omeprazole twice daily  -sx are unchanged   - still with constant \"acid taste\" in mouth and can regurgitate food at times into esophagus(though son states he has never seen this)  -appetite good and has gained nearly 10 lbs   -denies melena, brbpr, vomiting.     Skin Tear   - L forearm  - scraped against door   - no spreading redness, fevers/chills.       The following have been reviewed and updated as appropriate in this visit:   Allergies  Meds  Problems         Review of Systems   Constitutional: Negative for appetite change, chills and fever.   Respiratory: Negative for chest tightness and shortness of breath.    Cardiovascular: Negative for chest pain and palpitations.   Gastrointestinal: Negative for anal bleeding, blood in stool and vomiting.        See hpi   Musculoskeletal: Positive for arthralgias (chronic and has refused PT or pain mgmt).   Neurological: Positive for light-headedness. Negative for syncope.   Psychiatric/Behavioral: Positive for dysphoric mood (sadness x1 wk after death of niece. ).       Objective     Vitals:    05/12/22 1026   BP: 110/70   BP Location: Left upper arm   Patient Position: Sitting   Pulse: 88   Resp: 16   Temp: 36.3 °C (97.3 °F)   TempSrc: Temporal   SpO2: 97%   Weight: 46.8 kg (103 lb 3.2 oz)   Height: 1.499 m (4' 11\")     Body mass index is 20.84 " kg/m².    Physical Exam  Constitutional:       General: She is not in acute distress.     Appearance: Normal appearance. She is normal weight. She is not ill-appearing, toxic-appearing or diaphoretic.   HENT:      Head: Normocephalic and atraumatic.   Neck:      Thyroid: No thyroid mass, thyromegaly or thyroid tenderness.   Cardiovascular:      Rate and Rhythm: Normal rate and regular rhythm.      Heart sounds: Normal heart sounds. No murmur heard.    No friction rub. No gallop.      Comments: No ectopic beats at all  Pulmonary:      Effort: Pulmonary effort is normal. No respiratory distress.      Breath sounds: Normal breath sounds. No stridor. No wheezing, rhonchi or rales.   Abdominal:      General: Abdomen is flat. Bowel sounds are normal. There is no distension.      Palpations: Abdomen is soft. There is no mass.      Tenderness: There is no abdominal tenderness. There is no guarding or rebound.      Hernia: No hernia is present.   Musculoskeletal:      Right lower leg: No edema.      Left lower leg: No edema.      Comments: Ambulating with rolling walker   Lymphadenopathy:      Cervical: No cervical adenopathy.   Skin:     Comments: Over the left lateral forearm, there is a oval-shaped skin tear with no surrounding redness, swelling, or active discharge/bleeding.  There is a scab forming.   Neurological:      Mental Status: She is alert and oriented to person, place, and time. Mental status is at baseline.   Psychiatric:      Comments: Pleasant and conversational         Assessment/Plan   Diagnoses and all orders for this visit:    Hypothyroidism due to Hashimoto's thyroiditis (Primary)  Assessment & Plan:  - Seems to be more clinically euthyroid, though her weight has increased  -She is overdue to have repeat TSH drawn, so they will go tomorrow    Orders:  -     TSH 3rd Generation; Future    Osteoarthritis involving multiple joints on both sides of body  Assessment & Plan:  - Her son, Jet, is requesting a  prescription for Percocet for her to use twice daily as needed  -I am obviously very hesitant to prescribe this given her frail status and the fact that she is a fall risk  -They continue to decline physical therapy  -She had side effects to both gabapentin and Lyrica  -I would like for them to get a pain management consultation with Dr. Singh to determine options    Orders:  -     Ambulatory referral to Pain Medicine; Future    Anxiety and depression  Assessment & Plan:  - With the death of her niece 2 weeks ago, and admits that she is more depressed than usual, which is not unexpected  -She is on the maximum dosage of Cymbalta for her kidney function  -I offered her psychotherapy for grief counseling with our office through our psychologist, Av, but she declines this  -They will let me know if they want to pursue this and will continue with prayer and relying on friends/family for support      Atrial ectopy  Assessment & Plan:  - I auscultated her heart for a full 60 seconds today and there was no ectopy whatsoever  -Perhaps this is due to normalization of her thyroid function and a TSH will confirm this tomorrow  -They never saw cardiology as I recommended at her dyspnea has resolved  -She no longer has palpitations either, but does feel slight lightheadedness upon standing which is likely orthostatic  -Recommended hydration and they continue to refuse cardiology consult, so we will simply monitor every 6 months and ensure appropriate electrolytes and thyroid function    Orders:  -     Comprehensive metabolic panel; Future  -     TSH 3rd Generation; Future  -     CBC and Differential; Future    GERD without esophagitis  Assessment & Plan:  - Still not yet seen GI despite my recommendation  -She continues with swallowing issues, which does warrant evaluation  -Fortunately, she is not choking or coughing with meals, but warning signs given  -Perhaps GI can switch her to an alternative acid suppressing regimen as she  is having persistent symptoms despite omeprazole 40 mg twice daily      Age-related osteoporosis without current pathological fracture  Assessment & Plan:  - Trend vitamin D with labs  -Not a candidate for oral therapy due to her GERD issues and they declined treatment with other injectable options    Orders:  -     Vitamin D 25 hydroxy; Future    Skin tear of left forearm without complication, initial encounter  Assessment & Plan:  - No symptoms of superimposed cellulitis or abscess, but warning signs given  -Appears to be healing  -Add mupirocin 3 times daily to aid in this process  -Update Td today    Orders:  -     Td vaccine greater than or equal to 8yo preservative free IM    Hyperlipidemia, unspecified hyperlipidemia type  -     Comprehensive metabolic panel; Future  -     Lipid panel; Future    Chronic kidney disease, stage II (mild)  Assessment & Plan:  - Trend creatinine with labs      Other orders  -     mupirocin (BACTROBAN) 2 % ointment; Apply 1 application topically 3 (three) times a day for 10 days.

## 2022-05-12 NOTE — ASSESSMENT & PLAN NOTE
- Her son, Jet, is requesting a prescription for Percocet for her to use twice daily as needed  -I am obviously very hesitant to prescribe this given her frail status and the fact that she is a fall risk  -They continue to decline physical therapy  -She had side effects to both gabapentin and Lyrica  -I would like for them to get a pain management consultation with Dr. Singh to determine options

## 2022-08-18 DIAGNOSIS — F41.9 ANXIETY AND DEPRESSION: Primary | ICD-10-CM

## 2022-08-18 DIAGNOSIS — F32.A ANXIETY AND DEPRESSION: Primary | ICD-10-CM

## 2022-08-18 RX ORDER — DULOXETIN HYDROCHLORIDE 20 MG/1
CAPSULE, DELAYED RELEASE ORAL
Qty: 180 CAPSULE | Refills: 0 | Status: SHIPPED | OUTPATIENT
Start: 2022-08-18 | End: 2023-02-20

## 2022-08-18 NOTE — TELEPHONE ENCOUNTER
Medicine Refill Request    Last Office: 5/12/2022   Last Consult Visit: Visit date not found  Last Telemedicine Visit: Visit date not found    Next Appointment: Visit date not found      Current Outpatient Medications:   •  albuterol HFA (PROAIR HFA) 90 mcg/actuation inhaler, Inhale 2 puffs every 4 (four) hours as needed for wheezing., Disp: 1 Inhaler, Rfl: 1  •  allopurinoL (ZYLOPRIM) 300 mg tablet, take 1/2 tablet by mouth once daily, Disp: 45 tablet, Rfl: 0  •  cholecalciferol, vitamin D3, 1,000 unit (25 mcg) tablet, Take 1,000 Units by mouth daily., Disp: , Rfl:   •  conjugated estrogens (PREMARIN) 0.625 mg/gram vaginal cream, apply small pea sized amount to external vaginal/urethral area daily for 2 weeks then 2x/week, Disp: , Rfl:   •  cyanocobalamin (VITAMIN B12) 1,000 mcg tablet, Take 1,000 mcg by mouth daily., Disp: , Rfl:   •  diclofenac sodium (VOLTAREN) 1 % topical gel, apply 4 grams to affected area four times a day, Disp: , Rfl: 0  •  DULoxetine (CYMBALTA) 20 mg capsule, take 1 capsule by mouth twice a day, Disp: 180 capsule, Rfl: 0  •  fexofenadine (ALLEGRA) 180 mg tablet, Take 180 mg by mouth daily., Disp: , Rfl:   •  levothyroxine (SYNTHROID) 75 mcg tablet, Take 1 tablet (75 mcg total) by mouth daily., Disp: 90 tablet, Rfl: 0  •  magnesium oxide 400 mg capsule, 400 mg., Disp: , Rfl:   •  omeprazole (PriLOSEC) 40 mg capsule, Take 40 mg by mouth 2 (two) times a day., Disp: , Rfl:       BP Readings from Last 3 Encounters:   05/12/22 110/70   10/11/21 112/60   08/31/21 110/60       Recent Lab results:  Lab Results   Component Value Date    CHOL 218 (H) 01/04/2021   ,   Lab Results   Component Value Date    HDL 88 01/04/2021   ,   Lab Results   Component Value Date    LDLCALC 114 (H) 01/04/2021   ,   Lab Results   Component Value Date    TRIG 80 01/04/2021        Lab Results   Component Value Date    GLUCOSE 119 (H) 08/31/2021   , No results found for: HGBA1C      Lab Results   Component Value Date     CREATININE 0.9 08/31/2021       Lab Results   Component Value Date    TSH 47.00 (H) 03/16/2022

## 2022-08-29 ENCOUNTER — TELEPHONE (OUTPATIENT)
Dept: PRIMARY CARE | Facility: CLINIC | Age: 87
End: 2022-08-29
Payer: MEDICARE

## 2022-08-29 NOTE — TELEPHONE ENCOUNTER
Message left on results voicemail as follows:    Should vinita does labs prior to coming back for her appt?

## 2022-08-29 NOTE — TELEPHONE ENCOUNTER
She was supposed to have her labs done in May, but never did.  They should get them done ASAP and I will call them with the result, but they should also schedule follow-up visit

## 2022-08-29 NOTE — TELEPHONE ENCOUNTER
Labs orders from May 2022. Pt does not have another appt scheduled would you want her to have labs done and then schedule appt ? Please advise

## 2022-08-31 ENCOUNTER — APPOINTMENT (OUTPATIENT)
Dept: LAB | Facility: CLINIC | Age: 87
End: 2022-08-31
Attending: FAMILY MEDICINE
Payer: MEDICARE

## 2022-08-31 DIAGNOSIS — I49.1 ATRIAL ECTOPY: ICD-10-CM

## 2022-08-31 DIAGNOSIS — E06.3 HYPOTHYROIDISM DUE TO HASHIMOTO'S THYROIDITIS: ICD-10-CM

## 2022-08-31 DIAGNOSIS — M81.0 AGE-RELATED OSTEOPOROSIS WITHOUT CURRENT PATHOLOGICAL FRACTURE: ICD-10-CM

## 2022-08-31 DIAGNOSIS — E78.5 HYPERLIPIDEMIA, UNSPECIFIED HYPERLIPIDEMIA TYPE: ICD-10-CM

## 2022-08-31 LAB
25(OH)D3 SERPL-MCNC: 59 NG/ML (ref 30–100)
ALBUMIN SERPL-MCNC: 3.8 G/DL (ref 3.4–5)
ALP SERPL-CCNC: 59 IU/L (ref 35–126)
ALT SERPL-CCNC: 12 IU/L (ref 11–54)
ANION GAP SERPL CALC-SCNC: 8 MEQ/L (ref 3–15)
AST SERPL-CCNC: 24 IU/L (ref 15–41)
BASOPHILS # BLD: 0.03 K/UL (ref 0.01–0.1)
BASOPHILS NFR BLD: 0.4 %
BILIRUB SERPL-MCNC: 0.5 MG/DL (ref 0.3–1.2)
BUN SERPL-MCNC: 18 MG/DL (ref 8–20)
CALCIUM SERPL-MCNC: 9.6 MG/DL (ref 8.9–10.3)
CHLORIDE SERPL-SCNC: 109 MEQ/L (ref 98–109)
CHOLEST SERPL-MCNC: 213 MG/DL
CO2 SERPL-SCNC: 27 MEQ/L (ref 22–32)
CREAT SERPL-MCNC: 0.9 MG/DL (ref 0.6–1.1)
DIFFERENTIAL METHOD BLD: ABNORMAL
EOSINOPHIL # BLD: 0.26 K/UL (ref 0.04–0.36)
EOSINOPHIL NFR BLD: 3.8 %
ERYTHROCYTE [DISTWIDTH] IN BLOOD BY AUTOMATED COUNT: 13.7 % (ref 11.7–14.4)
GFR SERPL CREATININE-BSD FRML MDRD: 59.5 ML/MIN/1.73M*2
GLUCOSE SERPL-MCNC: 94 MG/DL (ref 70–99)
HCT VFR BLDCO AUTO: 41.1 % (ref 35–45)
HDLC SERPL-MCNC: 64 MG/DL
HDLC SERPL: 3.3 {RATIO}
HGB BLD-MCNC: 12.7 G/DL (ref 11.8–15.7)
IMM GRANULOCYTES # BLD AUTO: 0.02 K/UL (ref 0–0.08)
IMM GRANULOCYTES NFR BLD AUTO: 0.3 %
LDLC SERPL CALC-MCNC: 127 MG/DL
LYMPHOCYTES # BLD: 2.04 K/UL (ref 1.2–3.5)
LYMPHOCYTES NFR BLD: 30.1 %
MCH RBC QN AUTO: 30 PG (ref 28–33.2)
MCHC RBC AUTO-ENTMCNC: 30.9 G/DL (ref 32.2–35.5)
MCV RBC AUTO: 96.9 FL (ref 83–98)
MONOCYTES # BLD: 0.59 K/UL (ref 0.28–0.8)
MONOCYTES NFR BLD: 8.7 %
NEUTROPHILS # BLD: 3.84 K/UL (ref 1.7–7)
NEUTS SEG NFR BLD: 56.7 %
NONHDLC SERPL-MCNC: 149 MG/DL
NRBC BLD-RTO: 0 %
PDW BLD AUTO: 9.7 FL (ref 9.4–12.3)
PLATELET # BLD AUTO: 283 K/UL (ref 150–369)
POTASSIUM SERPL-SCNC: 4.2 MEQ/L (ref 3.6–5.1)
PROT SERPL-MCNC: 6.2 G/DL (ref 6–8.2)
RBC # BLD AUTO: 4.24 M/UL (ref 3.93–5.22)
SODIUM SERPL-SCNC: 144 MEQ/L (ref 136–144)
TRIGL SERPL-MCNC: 110 MG/DL (ref 30–149)
TSH SERPL DL<=0.05 MIU/L-ACNC: 1.99 MIU/L (ref 0.34–5.6)
WBC # BLD AUTO: 6.78 K/UL (ref 3.8–10.5)

## 2022-08-31 PROCEDURE — 80053 COMPREHEN METABOLIC PANEL: CPT

## 2022-08-31 PROCEDURE — 82306 VITAMIN D 25 HYDROXY: CPT

## 2022-08-31 PROCEDURE — 84443 ASSAY THYROID STIM HORMONE: CPT

## 2022-08-31 PROCEDURE — 85025 COMPLETE CBC W/AUTO DIFF WBC: CPT

## 2022-08-31 PROCEDURE — 36415 COLL VENOUS BLD VENIPUNCTURE: CPT

## 2022-08-31 PROCEDURE — 80061 LIPID PANEL: CPT

## 2022-09-01 ENCOUNTER — TELEPHONE (OUTPATIENT)
Dept: PRIMARY CARE | Facility: CLINIC | Age: 87
End: 2022-09-01
Payer: MEDICARE

## 2022-09-01 DIAGNOSIS — E06.3 HYPOTHYROIDISM DUE TO HASHIMOTO'S THYROIDITIS: Primary | ICD-10-CM

## 2022-09-01 RX ORDER — LEVOTHYROXINE SODIUM 75 UG/1
75 TABLET ORAL
Qty: 90 TABLET | Refills: 1 | Status: SHIPPED | OUTPATIENT
Start: 2022-09-01 | End: 2023-09-01

## 2022-09-01 NOTE — TELEPHONE ENCOUNTER
Called and s/w pt's son Jet  He is aware of lab results and will schedule follow up appt for pt  He is requesting refill on Levothyroxine 75mcg   Refill pended

## 2022-09-01 NOTE — TELEPHONE ENCOUNTER
Please call son, Jet, to let him know that patient's labs all look quite good  -Specifically, her TSH is now within the appropriate range so she is on the correct dosage of levothyroxine-continue this  -I would like to see her in the office in the next 2 months for a 6-month follow-up, so please have him schedule or sooner if needed

## 2023-02-22 PROBLEM — A49.02 MRSA (METHICILLIN RESISTANT STAPHYLOCOCCUS AUREUS): Status: RESOLVED | Noted: 2018-06-13 | Resolved: 2023-02-22

## 2023-02-22 PROBLEM — L89.321 PRESSURE INJURY OF LEFT BUTTOCK, STAGE 1: Status: RESOLVED | Noted: 2021-04-07 | Resolved: 2023-02-22

## 2023-02-22 PROBLEM — F43.10 STRESS DISORDER, POST TRAUMATIC: Status: RESOLVED | Noted: 2018-09-26 | Resolved: 2023-02-22

## 2023-02-22 PROBLEM — S51.812A SKIN TEAR OF LEFT FOREARM WITHOUT COMPLICATION: Status: RESOLVED | Noted: 2020-12-30 | Resolved: 2023-02-22

## 2023-03-15 ENCOUNTER — TELEPHONE (OUTPATIENT)
Dept: PRIMARY CARE | Facility: CLINIC | Age: 88
End: 2023-03-15
Payer: MEDICARE

## 2023-03-15 DIAGNOSIS — E06.3 HYPOTHYROIDISM DUE TO HASHIMOTO'S THYROIDITIS: Primary | ICD-10-CM

## 2023-03-15 DIAGNOSIS — E78.5 HYPERLIPIDEMIA, UNSPECIFIED HYPERLIPIDEMIA TYPE: ICD-10-CM

## 2023-03-15 DIAGNOSIS — N18.2 CHRONIC KIDNEY DISEASE, STAGE II (MILD): ICD-10-CM

## 2023-05-08 ENCOUNTER — TELEPHONE (OUTPATIENT)
Dept: PRIMARY CARE | Facility: CLINIC | Age: 88
End: 2023-05-08
Payer: MEDICARE

## 2023-05-08 DIAGNOSIS — R35.0 URINARY FREQUENCY: Primary | ICD-10-CM

## 2023-05-08 NOTE — TELEPHONE ENCOUNTER
Please let them know that urine sample has been ordered and they can submit this tomorrow.  Please determine if any other symptoms that warrant her being started on antibiotic in the meantime, including any burning, bladder pressure, or difficulty urinating.    Also, please confirm that no fever, vomiting, abdominal pain, or flank pain that would require ER

## 2023-05-08 NOTE — TELEPHONE ENCOUNTER
Pt thinks she may have UTI she has frequent urination usually overnight.  Due to transportation issues, she is only able to schedule appt for 05/10/2023 (appt is scheduled).    Pt is not sure if they need to provide urine sample in the meantime.

## 2023-05-08 NOTE — TELEPHONE ENCOUNTER
Called and s/w pt's son, Jet. He is aware of UA/cx lab order. He stated pt's only symptom is urinary frequency. He denies fever, abdominal and flank pain, vomiting, dysuria  Advised we would call him once we receive results

## 2023-05-10 ENCOUNTER — TELEPHONE (OUTPATIENT)
Dept: PRIMARY CARE | Facility: CLINIC | Age: 88
End: 2023-05-10

## 2023-05-10 NOTE — TELEPHONE ENCOUNTER
[facility] Appointment Request   Provider: Dr Prater  Appointment Type: OV  Reason for Visit: UTI, . Son had to cancel today's appointment because of car trouble.  Available Day and Time: D  Best Contact Number: [phone]     The practice will reach out to schedule your appointment within the next 2 business days.

## 2023-08-11 DIAGNOSIS — F41.9 ANXIETY AND DEPRESSION: ICD-10-CM

## 2023-08-11 DIAGNOSIS — F32.A ANXIETY AND DEPRESSION: ICD-10-CM

## 2023-08-11 RX ORDER — DULOXETIN HYDROCHLORIDE 20 MG/1
CAPSULE, DELAYED RELEASE ORAL
Qty: 180 CAPSULE | Refills: 0 | Status: SHIPPED | OUTPATIENT
Start: 2023-08-11

## 2023-08-11 RX ORDER — ALLOPURINOL 300 MG/1
TABLET ORAL
Qty: 135 TABLET | Refills: 0 | Status: SHIPPED | OUTPATIENT
Start: 2023-08-11

## 2023-08-11 NOTE — TELEPHONE ENCOUNTER
Medicine Refill Request    Last Office Visit: 5/12/2022   Last Consult Visit: Visit date not found  Last Telemedicine Visit: Visit date not found    Next Appointment: Visit date not found      Current Outpatient Medications:   •  albuterol HFA (PROAIR HFA) 90 mcg/actuation inhaler, Inhale 2 puffs every 4 (four) hours as needed for wheezing., Disp: 1 Inhaler, Rfl: 1  •  allopurinoL (ZYLOPRIM) 300 mg tablet, take 1/2 tablet by mouth once daily, Disp: 135 tablet, Rfl: 0  •  cholecalciferol, vitamin D3, 1,000 unit (25 mcg) tablet, Take 1,000 Units by mouth daily., Disp: , Rfl:   •  conjugated estrogens (PREMARIN) 0.625 mg/gram vaginal cream, apply small pea sized amount to external vaginal/urethral area daily for 2 weeks then 2x/week, Disp: , Rfl:   •  cyanocobalamin (VITAMIN B12) 1,000 mcg tablet, Take 1,000 mcg by mouth daily., Disp: , Rfl:   •  diclofenac sodium (VOLTAREN) 1 % topical gel, apply 4 grams to affected area four times a day, Disp: , Rfl: 0  •  DULoxetine (CYMBALTA) 20 mg capsule, take 1 capsule by mouth twice a day, Disp: 180 capsule, Rfl: 0  •  fexofenadine (ALLEGRA) 180 mg tablet, Take 180 mg by mouth daily., Disp: , Rfl:   •  levothyroxine (SYNTHROID) 75 mcg tablet, Take 1 tablet (75 mcg total) by mouth daily., Disp: 90 tablet, Rfl: 1  •  magnesium oxide 400 mg capsule, 400 mg., Disp: , Rfl:   •  omeprazole (PriLOSEC) 40 mg capsule, Take 40 mg by mouth 2 (two) times a day., Disp: , Rfl:       BP Readings from Last 3 Encounters:   05/12/22 110/70   10/11/21 112/60   08/31/21 110/60       Recent Lab results:  Lab Results   Component Value Date    CHOL 213 (H) 08/31/2022   ,   Lab Results   Component Value Date    HDL 64 08/31/2022   ,   Lab Results   Component Value Date    LDLCALC 127 (H) 08/31/2022   ,   Lab Results   Component Value Date    TRIG 110 08/31/2022        Lab Results   Component Value Date    GLUCOSE 94 08/31/2022   , No results found for: HGBA1C      Lab Results   Component Value Date     CREATININE 0.9 08/31/2022       Lab Results   Component Value Date    TSH 1.99 08/31/2022         No results found for: HGBA1C

## 2023-08-24 ENCOUNTER — TELEPHONE (OUTPATIENT)
Dept: PRIMARY CARE | Facility: CLINIC | Age: 88
End: 2023-08-24
Payer: MEDICARE

## 2023-08-24 NOTE — TELEPHONE ENCOUNTER
S/W son, he explained how mom was just admitted there on Monday and has fallen 2 times already, he is afraid for her safety and feels if he speaks to their  he may make things worse.   I reached out to Lindsey Mendez to see if there is anything she can do .

## 2023-08-24 NOTE — TELEPHONE ENCOUNTER
Please let patient know that unfortunately I have no ability to get patient transferred to a new or different rehab facility.  He should try to speak with the medical staff there to express his concerns.  We could have social work touch base with him as well if he is interested, so please flag [facility] if he is agreeable to that

## 2023-08-24 NOTE — TELEPHONE ENCOUNTER
Pt's son Jet came into office, he is concerned about pt, she was recently discharged from [facility] to [facility] on 08/21/2023.     Pt had Closed Left Hip Fx and is scheduled to be in rehab for the next 90 days.    Jet is concerned about level of care, pt has already fallen twice at Rehab Facility, she is upset and wants to go home,  She tried to leave by herself the other day.    Jet wanted to know if we could provide information on how to get pt transferred to another facility.  He does not feel that  is helping him.

## 2023-08-24 NOTE — TELEPHONE ENCOUNTER
Can we place referral for [facility] to contact son or since pt is in a rehab they would need to use their ? Please advise

## 2023-08-25 NOTE — TELEPHONE ENCOUNTER
SHUN Ramos she gave some suggestion on what the son can do. Discussed that the son should talk to SW, nursing administer or director at the facility and voice his concerns. Son is agreeable

## 2023-10-03 ENCOUNTER — TELEPHONE (OUTPATIENT)
Dept: PRIMARY CARE | Facility: CLINIC | Age: 88
End: 2023-10-03
Payer: MEDICARE

## 2023-10-03 NOTE — TELEPHONE ENCOUNTER
[facility] Appointment Request   Provider: Dr Bhagat  Appointment Type: Hosp F/U  Reason for Visit: Staple removal, Treated at [facility] on 9/28/23  Available Day and Time: 10/5/23 anytime  Best Contact Number: [phone]    The practice will reach out to schedule your appointment within the next 2 business days.

## 2023-10-04 NOTE — TELEPHONE ENCOUNTER
Jet will call back . Jet is going to come over tomorrow after he see her in the morning.  Then he will sched an apptment. All he knows is the staples are in back of head

## 2023-10-05 ENCOUNTER — OFFICE VISIT (OUTPATIENT)
Dept: PRIMARY CARE | Facility: CLINIC | Age: 88
End: 2023-10-05
Payer: MEDICARE

## 2023-10-05 VITALS — TEMPERATURE: 97 F | RESPIRATION RATE: 16 BRPM

## 2023-10-05 DIAGNOSIS — Z48.02: Primary | ICD-10-CM

## 2023-10-05 DIAGNOSIS — S01.01XD SCALP LACERATION, SUBSEQUENT ENCOUNTER: ICD-10-CM

## 2023-10-05 PROCEDURE — 99212 OFFICE O/P EST SF 10 MIN: CPT | Mod: 25 | Performed by: FAMILY MEDICINE

## 2023-10-05 PROCEDURE — 15853 REMOVAL SUTR/STAPL XREQ ANES: CPT | Performed by: FAMILY MEDICINE

## 2023-10-05 ASSESSMENT — PATIENT HEALTH QUESTIONNAIRE - PHQ9: SUM OF ALL RESPONSES TO PHQ9 QUESTIONS 1 & 2: 0

## 2023-10-05 NOTE — PROGRESS NOTES
Subjective      Patient ID: Lida Foley is a 88 y.o. female     HPI: Staple removal from head.  Unwitnessed fall September 28, 2023, brought to [facility] emergency department.  Work-up included a CT of the head without IV contrast which did not show evidence of intracranial hemorrhage mass effect or midline shift.  3 staples were placed for repair with laceration length of about 3 cm.    The following have been reviewed and updated as appropriate in this visit:        Review of Systems   Unable to perform ROS: Patient nonverbal     Vitals:    10/05/23 1300   Resp: 16   Temp: 36.1 °C (97 °F)   TempSrc: Temporal      Current Outpatient Medications   Medication Sig Dispense Refill    allopurinoL (ZYLOPRIM) 300 mg tablet take 1/2 tablet by mouth once daily 135 tablet 0    cholecalciferol, vitamin D3, 1,000 unit (25 mcg) tablet Take 1,000 Units by mouth daily.      conjugated estrogens (PREMARIN) 0.625 mg/gram vaginal cream apply small pea sized amount to external vaginal/urethral area daily for 2 weeks then 2x/week      cyanocobalamin (VITAMIN B12) 1,000 mcg tablet Take 1,000 mcg by mouth daily.      diclofenac sodium (VOLTAREN) 1 % topical gel apply 4 grams to affected area four times a day  0    DULoxetine (CYMBALTA) 20 mg capsule take 1 capsule by mouth twice a day 180 capsule 0    fexofenadine (ALLEGRA) 180 mg tablet Take 180 mg by mouth daily.      magnesium oxide 400 mg capsule 400 mg.      omeprazole (PriLOSEC) 40 mg capsule Take 40 mg by mouth 2 (two) times a day.      albuterol HFA (PROAIR HFA) 90 mcg/actuation inhaler Inhale 2 puffs every 4 (four) hours as needed for wheezing. 1 Inhaler 1    levothyroxine (SYNTHROID) 75 mcg tablet Take 1 tablet (75 mcg total) by mouth daily. 90 tablet 1     No current facility-administered medications for this visit.      Social History     Tobacco Use    Smoking status: Former     Types: Cigarettes    Smokeless tobacco: Never   Substance Use Topics     Alcohol use: No    Drug use: No      Family History   Problem Relation Age of Onset    Breast cancer Biological Sister     Lymphoma Biological Brother     Breast cancer Biological Daughter         No past medical history on file.     Objective     Physical Exam  Constitutional:       Appearance: Normal appearance. She is normal weight.   Skin:     General: Skin is warm.      Comments: Laceration noted left parietal area with 3 staples.  Crusting and matting is noted   Neurological:      Mental Status: She is alert and oriented to person, place, and time.   Psychiatric:         Mood and Affect: Mood normal.         Problem List Items Addressed This Visit        Other    Encounter for removal of staples - Primary     3 staples removed from the left upper parietal area.  No sign of infection patient tolerated the procedure well.         Scalp laceration, subsequent encounter     Scalp laceration has healed appropriately no signs of infection.            Assessment/Plan

## 2023-10-05 NOTE — ASSESSMENT & PLAN NOTE
3 staples removed from the left upper parietal area.  No sign of infection patient tolerated the procedure well.